# Patient Record
Sex: FEMALE | Race: WHITE | NOT HISPANIC OR LATINO
[De-identification: names, ages, dates, MRNs, and addresses within clinical notes are randomized per-mention and may not be internally consistent; named-entity substitution may affect disease eponyms.]

---

## 2019-08-16 PROBLEM — Z00.00 ENCOUNTER FOR PREVENTIVE HEALTH EXAMINATION: Status: ACTIVE | Noted: 2019-08-16

## 2019-09-11 ENCOUNTER — APPOINTMENT (OUTPATIENT)
Dept: PULMONOLOGY | Facility: CLINIC | Age: 74
End: 2019-09-11

## 2019-09-16 ENCOUNTER — APPOINTMENT (OUTPATIENT)
Dept: PULMONOLOGY | Facility: CLINIC | Age: 74
End: 2019-09-16
Payer: MEDICARE

## 2019-09-16 VITALS
HEART RATE: 81 BPM | RESPIRATION RATE: 16 BRPM | OXYGEN SATURATION: 94 % | HEIGHT: 64 IN | BODY MASS INDEX: 20.55 KG/M2 | SYSTOLIC BLOOD PRESSURE: 176 MMHG | DIASTOLIC BLOOD PRESSURE: 82 MMHG | TEMPERATURE: 97.6 F | WEIGHT: 120.38 LBS

## 2019-09-16 DIAGNOSIS — Z86.79 PERSONAL HISTORY OF OTHER DISEASES OF THE CIRCULATORY SYSTEM: ICD-10-CM

## 2019-09-16 PROCEDURE — 99203 OFFICE O/P NEW LOW 30 MIN: CPT

## 2019-09-16 NOTE — REVIEW OF SYSTEMS
[Fatigue] : fatigue [Hypertension] : ~T hypertension [As Noted in HPI] : as noted in HPI [Back Pain] : ~T back pain [Negative] : Allergy/Immunology

## 2019-09-16 NOTE — PHYSICAL EXAM
[Normal Conjunctiva] : the conjunctiva exhibited no abnormalities [General Appearance - In No Acute Distress] : no acute distress [Normal Oropharynx] : normal oropharynx [Heart Rate And Rhythm] : heart rate and rhythm were normal [Neck Appearance] : the appearance of the neck was normal [Heart Sounds] : normal S1 and S2 [] : no respiratory distress [Abnormal Walk] : normal gait [Nail Clubbing] : no clubbing of the fingernails [Cyanosis, Localized] : no localized cyanosis [No Focal Deficits] : no focal deficits [FreeTextEntry1] : crackles left anterior chest, right anterior chest and right lower lobe

## 2019-09-16 NOTE — REASON FOR VISIT
[Initial Evaluation] : an initial evaluation [Abnormal CT Scan] : abnormal CT Scan [Spouse] : spouse

## 2019-09-16 NOTE — HISTORY OF PRESENT ILLNESS
[FreeTextEntry1] : 73-year-old female with abnormal CAT scan of her chest. The patient reports that she has been in good health her entire life. She was born in [place] then emigrated to this country  40 years ago. 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 She reports a mild and intermittent dry cough but she denies any dyspnea, chest pain, recurrent fevers or a productive cough. She has hypertension for which she takes metoprolol. The cough is not particularly triggered by any events but it seems to be relieved by albuterol via nebulizer. The patient was prescribed this many years ago by a Dr. Zeferino Kaur.

## 2019-09-16 NOTE — ASSESSMENT
[FreeTextEntry1] : . The patient's  remembers her having been told that she had multiple scars on her lungs when she was younger. Based on the CAT scan, I suspect that she must of had a severe pneumonia which left her with multiple areas of bronchiectasis. Very surprisingly she has very little in the way of symptoms. Apparently she had a CT chest in 2016 which I do not have. Currently she denies any significant dyspnea or symptoms of recurrent infection. The cough seems minor and it is relieved occasionally by albuterol. I have not changed her medication. I've asked her to get a followup CAT scan in 3 months\par Her current CT shows extensive bornchiectasis with several areas changed since 2016. Most of the abnormalities appear to be inflammatory. She should follow up with me in 3 months for pulmonary functions and a plan to have follow up CTs and treatment.\par \par She currently is in between primary physicians and so I will wait till she is assigned a new primary physician before for sending this material to that physician.

## 2021-04-12 ENCOUNTER — APPOINTMENT (OUTPATIENT)
Dept: NEUROLOGY | Facility: CLINIC | Age: 76
End: 2021-04-12
Payer: MEDICARE

## 2021-04-12 ENCOUNTER — LABORATORY RESULT (OUTPATIENT)
Age: 76
End: 2021-04-12

## 2021-04-12 VITALS
WEIGHT: 110 LBS | HEIGHT: 64 IN | HEART RATE: 75 BPM | OXYGEN SATURATION: 95 % | BODY MASS INDEX: 18.78 KG/M2 | TEMPERATURE: 97.2 F | DIASTOLIC BLOOD PRESSURE: 82 MMHG | SYSTOLIC BLOOD PRESSURE: 150 MMHG

## 2021-04-12 DIAGNOSIS — Z78.9 OTHER SPECIFIED HEALTH STATUS: ICD-10-CM

## 2021-04-12 DIAGNOSIS — Z80.1 FAMILY HISTORY OF MALIGNANT NEOPLASM OF TRACHEA, BRONCHUS AND LUNG: ICD-10-CM

## 2021-04-12 DIAGNOSIS — Z87.898 PERSONAL HISTORY OF OTHER SPECIFIED CONDITIONS: ICD-10-CM

## 2021-04-12 DIAGNOSIS — Z80.41 FAMILY HISTORY OF MALIGNANT NEOPLASM OF OVARY: ICD-10-CM

## 2021-04-12 DIAGNOSIS — Z72.89 OTHER PROBLEMS RELATED TO LIFESTYLE: ICD-10-CM

## 2021-04-12 PROCEDURE — 99072 ADDL SUPL MATRL&STAF TM PHE: CPT

## 2021-04-12 PROCEDURE — 99205 OFFICE O/P NEW HI 60 MIN: CPT

## 2021-04-12 RX ORDER — PRAVASTATIN SODIUM 20 MG/1
20 TABLET ORAL
Refills: 0 | Status: ACTIVE | COMMUNITY

## 2021-04-12 RX ORDER — DONEPEZIL HYDROCHLORIDE 5 MG/1
5 TABLET ORAL
Refills: 0 | Status: ACTIVE | COMMUNITY

## 2021-04-12 RX ORDER — METOPROLOL SUCCINATE 50 MG/1
50 TABLET, EXTENDED RELEASE ORAL
Refills: 0 | Status: ACTIVE | COMMUNITY

## 2021-04-12 RX ORDER — METOPROLOL SUCCINATE 200 MG/1
TABLET, EXTENDED RELEASE ORAL
Refills: 0 | Status: DISCONTINUED | COMMUNITY
End: 2021-04-12

## 2021-04-12 RX ORDER — SIMVASTATIN 80 MG/1
TABLET, FILM COATED ORAL
Refills: 0 | Status: DISCONTINUED | COMMUNITY
End: 2021-04-12

## 2021-04-12 NOTE — PHYSICAL EXAM
[General Appearance - Alert] : alert [General Appearance - In No Acute Distress] : in no acute distress [Person] : oriented to person [Registration Intact] : recent registration memory intact [Naming Objects] : no difficulty naming common objects [Fluency] : fluency intact [Comprehension] : comprehension intact [Vocabulary] : adequate range of vocabulary [Total Score ___ / 30] : the patient achieved a score of [unfilled] /30 [Date / Time ___ / 5] : date / time [unfilled] / 5 [Place ___ / 5] : place [unfilled] / 5 [Registration ___ / 3] : registration [unfilled] / 3 [Serial Sevens ___/5] : serial sevens [unfilled] / 5 [Naming 2 Objects ___ / 2] : naming two objects [unfilled] / 2 [Repeating a Sentence ___ / 1] : repeating a sentence [unfilled] / 1 [Writing a Sentence ___ / 1] : write sentence [unfilled] / 1 [3-stage Verbal Command ___ / 3] : three-stage verbal command [unfilled] / 3 [Written Command ___ / 1] : written command [unfilled] / 1 [Copy a Design ___ / 1] : copy a design [unfilled] / 1 [Recall ___ / 3] : recall [unfilled] / 3 [Cranial Nerves Optic (II)] : visual acuity intact bilaterally,  visual fields full to confrontation, pupils equal round and reactive to light [Cranial Nerves Oculomotor (III)] : extraocular motion intact [Cranial Nerves Trigeminal (V)] : facial sensation intact symmetrically [Cranial Nerves Facial (VII)] : face symmetrical [Cranial Nerves Vestibulocochlear (VIII)] : hearing was intact bilaterally [Cranial Nerves Glossopharyngeal (IX)] : tongue and palate midline [Cranial Nerves Accessory (XI - Cranial And Spinal)] : head turning and shoulder shrug symmetric [Cranial Nerves Hypoglossal (XII)] : there was no tongue deviation with protrusion [Motor Strength] : muscle strength was normal in all four extremities [Motor Tone] : muscle tone was normal in all four extremities [Involuntary Movements] : no involuntary movements were seen [Sensation Tactile Decrease] : light touch was intact [Abnormal Walk] : normal gait [Balance] : balance was intact [Place] : disoriented to place [Time] : disoriented to time [Short Term Intact] : short term memory impaired [Concentration Intact] : a decrease in concentrating ability was observed [Coordination - Dysmetria Impaired Finger-to-Nose Bilateral] : not present [Coordination - Dysmetria Impaired Heel-to-Shin Bilateral] : not present [FreeTextEntry4] : MMSE 23/30, completed HS

## 2021-04-12 NOTE — ASSESSMENT
[FreeTextEntry1] : Memory loss MMSE 23/30, completed HS, she has difficulty with memory and processing information as well as weight loss; these are concerning for possible dementia (FT>AD), stroke and metastatic dz.  Will get MRI skip with and w/o caitie to evaluate for cva, mets and nph.  Will get labs for reversible causes of dementia and paraneoplastic panel.\par \par Patient will go back to her PMD for cancer screening

## 2021-04-12 NOTE — CONSULT LETTER
[Dear  ___] : Dear  [unfilled], [Consult Letter:] : I had the pleasure of evaluating your patient, [unfilled]. [Please see my note below.] : Please see my note below. [Consult Closing:] : Thank you very much for allowing me to participate in the care of this patient.  If you have any questions, please do not hesitate to contact me. [Sincerely,] : Sincerely, [FreeTextEntry3] : Dee Dee Borjas MD, MPH\par

## 2021-04-12 NOTE — HISTORY OF PRESENT ILLNESS
[FreeTextEntry1] : The patient is here for evaluation of memeory problems present for 2 yrs.  The patient says that she has trouble with remembering events, later come back to her.  She does not have double with ADLs or a-ADLs, burning food, paying checks or getting lost.\par \par As per the daughter, the patient repeat same story within 1 hours.  She has difficulty with processing new information.  She no longer reading books or does puzzles.  She lacks confidence, which is new.\par \par She has no change of mood or sleep; has lost 30-40 lb over the past 2 yrs.  No chills.  She has cough, no blood in stools.  Last angeli 2020: normal, never had colonoscopy.\par \par No bowel or bladder incontinence or gait problems.  No focal stroke symptoms.\par \par Mother Dx with AD at age 70's

## 2021-05-04 ENCOUNTER — APPOINTMENT (OUTPATIENT)
Dept: NEUROLOGY | Facility: CLINIC | Age: 76
End: 2021-05-04
Payer: MEDICARE

## 2021-05-04 VITALS
HEART RATE: 78 BPM | WEIGHT: 110 LBS | SYSTOLIC BLOOD PRESSURE: 138 MMHG | DIASTOLIC BLOOD PRESSURE: 82 MMHG | TEMPERATURE: 97.7 F | OXYGEN SATURATION: 96 % | HEIGHT: 64 IN | BODY MASS INDEX: 18.78 KG/M2

## 2021-05-04 DIAGNOSIS — R41.3 OTHER AMNESIA: ICD-10-CM

## 2021-05-04 PROCEDURE — 99214 OFFICE O/P EST MOD 30 MIN: CPT

## 2021-05-04 PROCEDURE — 99072 ADDL SUPL MATRL&STAF TM PHE: CPT

## 2021-05-04 NOTE — ASSESSMENT
[FreeTextEntry1] : Memory loss MMSE 23/30, completed HS, she has difficulty with memory and processing information as well as weight loss; these are concerning for possible dementia (FT>AD), which MRI brain showing chronic foci of microhemorrhages in cerebellum, occipital and parietal lobes likely sequela of HTN or amyloid angiopathy.  Will get genetic testing for amyloid angiopathy.  No asa at this time.  Will cw Aricept\par \par Patient will go back to her PMD for cancer screening. \par \par

## 2021-05-04 NOTE — HISTORY OF PRESENT ILLNESS
[FreeTextEntry1] : The patient is here for evaluation of memeory problems present for 2 yrs. The patient says that she has trouble with remembering events, later come back to her. She does not have double with ADLs or a-ADLs, burning food, paying checks or getting lost.\par \par As per the daughter, the patient repeat same story within 1 hours. She has difficulty with processing new information. She no longer reading books or does puzzles. She lacks confidence, which is new.\par \par She has no change of mood or sleep; has lost 30-40 lb over the past 2 yrs. No chills. She has cough, no blood in stools. Last angeli 2020: normal, never had colonoscopy.\par \par No bowel or bladder incontinence or gait problems. No focal stroke symptoms.\par \par Mother Dx with AD at age 70's \par The memory is more leveled since started on Aricept as per the gee.

## 2021-05-04 NOTE — DATA REVIEWED
[de-identified] : chronic foci of microhemorrhages in cerebellum, occipital and parietal lobes likely requaela of HTN or amyloid angiopathy [de-identified] : b12/ folate, mma, tft and rpr normal\par paraneopastic panel -vikash

## 2021-05-31 ENCOUNTER — EMERGENCY (EMERGENCY)
Facility: HOSPITAL | Age: 76
LOS: 1 days | Discharge: HOME | End: 2021-05-31
Attending: EMERGENCY MEDICINE
Payer: COMMERCIAL

## 2021-05-31 VITALS
OXYGEN SATURATION: 96 % | SYSTOLIC BLOOD PRESSURE: 176 MMHG | DIASTOLIC BLOOD PRESSURE: 79 MMHG | WEIGHT: 108.03 LBS | RESPIRATION RATE: 16 BRPM | TEMPERATURE: 98 F | HEART RATE: 77 BPM

## 2021-05-31 VITALS
OXYGEN SATURATION: 98 % | DIASTOLIC BLOOD PRESSURE: 88 MMHG | TEMPERATURE: 98 F | SYSTOLIC BLOOD PRESSURE: 174 MMHG | HEART RATE: 76 BPM | RESPIRATION RATE: 16 BRPM

## 2021-05-31 LAB
ALBUMIN SERPL ELPH-MCNC: 3.4 G/DL — LOW (ref 3.5–5)
ALP SERPL-CCNC: 129 U/L — HIGH (ref 40–120)
ALT FLD-CCNC: 24 U/L DA — SIGNIFICANT CHANGE UP (ref 10–60)
ANION GAP SERPL CALC-SCNC: 5 MMOL/L — SIGNIFICANT CHANGE UP (ref 5–17)
APPEARANCE UR: CLEAR — SIGNIFICANT CHANGE UP
AST SERPL-CCNC: 20 U/L — SIGNIFICANT CHANGE UP (ref 10–40)
BACTERIA # UR AUTO: ABNORMAL /HPF
BASOPHILS # BLD AUTO: 0.05 K/UL — SIGNIFICANT CHANGE UP (ref 0–0.2)
BASOPHILS NFR BLD AUTO: 0.4 % — SIGNIFICANT CHANGE UP (ref 0–2)
BILIRUB SERPL-MCNC: 0.3 MG/DL — SIGNIFICANT CHANGE UP (ref 0.2–1.2)
BILIRUB UR-MCNC: NEGATIVE — SIGNIFICANT CHANGE UP
BUN SERPL-MCNC: 19 MG/DL — HIGH (ref 7–18)
CALCIUM SERPL-MCNC: 9.4 MG/DL — SIGNIFICANT CHANGE UP (ref 8.4–10.5)
CHLORIDE SERPL-SCNC: 102 MMOL/L — SIGNIFICANT CHANGE UP (ref 96–108)
CO2 SERPL-SCNC: 30 MMOL/L — SIGNIFICANT CHANGE UP (ref 22–31)
COLOR SPEC: YELLOW — SIGNIFICANT CHANGE UP
CREAT SERPL-MCNC: 0.73 MG/DL — SIGNIFICANT CHANGE UP (ref 0.5–1.3)
D DIMER BLD IA.RAPID-MCNC: 228 NG/ML DDU — SIGNIFICANT CHANGE UP
DIFF PNL FLD: ABNORMAL
EOSINOPHIL # BLD AUTO: 0.05 K/UL — SIGNIFICANT CHANGE UP (ref 0–0.5)
EOSINOPHIL NFR BLD AUTO: 0.4 % — SIGNIFICANT CHANGE UP (ref 0–6)
EPI CELLS # UR: ABNORMAL /HPF
GLUCOSE SERPL-MCNC: 130 MG/DL — HIGH (ref 70–99)
GLUCOSE UR QL: NEGATIVE — SIGNIFICANT CHANGE UP
HCT VFR BLD CALC: 38.7 % — SIGNIFICANT CHANGE UP (ref 34.5–45)
HGB BLD-MCNC: 12.8 G/DL — SIGNIFICANT CHANGE UP (ref 11.5–15.5)
IMM GRANULOCYTES NFR BLD AUTO: 0.3 % — SIGNIFICANT CHANGE UP (ref 0–1.5)
KETONES UR-MCNC: NEGATIVE — SIGNIFICANT CHANGE UP
LEUKOCYTE ESTERASE UR-ACNC: NEGATIVE — SIGNIFICANT CHANGE UP
LYMPHOCYTES # BLD AUTO: 1.05 K/UL — SIGNIFICANT CHANGE UP (ref 1–3.3)
LYMPHOCYTES # BLD AUTO: 9 % — LOW (ref 13–44)
MCHC RBC-ENTMCNC: 29.8 PG — SIGNIFICANT CHANGE UP (ref 27–34)
MCHC RBC-ENTMCNC: 33.1 GM/DL — SIGNIFICANT CHANGE UP (ref 32–36)
MCV RBC AUTO: 90.2 FL — SIGNIFICANT CHANGE UP (ref 80–100)
MONOCYTES # BLD AUTO: 1.21 K/UL — HIGH (ref 0–0.9)
MONOCYTES NFR BLD AUTO: 10.4 % — SIGNIFICANT CHANGE UP (ref 2–14)
NEUTROPHILS # BLD AUTO: 9.25 K/UL — HIGH (ref 1.8–7.4)
NEUTROPHILS NFR BLD AUTO: 79.5 % — HIGH (ref 43–77)
NITRITE UR-MCNC: NEGATIVE — SIGNIFICANT CHANGE UP
NRBC # BLD: 0 /100 WBCS — SIGNIFICANT CHANGE UP (ref 0–0)
NT-PROBNP SERPL-SCNC: 141 PG/ML — SIGNIFICANT CHANGE UP (ref 0–450)
PH UR: 5 — SIGNIFICANT CHANGE UP (ref 5–8)
PLATELET # BLD AUTO: 275 K/UL — SIGNIFICANT CHANGE UP (ref 150–400)
POTASSIUM SERPL-MCNC: 3.8 MMOL/L — SIGNIFICANT CHANGE UP (ref 3.5–5.3)
POTASSIUM SERPL-SCNC: 3.8 MMOL/L — SIGNIFICANT CHANGE UP (ref 3.5–5.3)
PROT SERPL-MCNC: 9.1 G/DL — HIGH (ref 6–8.3)
PROT UR-MCNC: 15
RBC # BLD: 4.29 M/UL — SIGNIFICANT CHANGE UP (ref 3.8–5.2)
RBC # FLD: 12.9 % — SIGNIFICANT CHANGE UP (ref 10.3–14.5)
RBC CASTS # UR COMP ASSIST: ABNORMAL /HPF (ref 0–2)
SARS-COV-2 RNA SPEC QL NAA+PROBE: SIGNIFICANT CHANGE UP
SODIUM SERPL-SCNC: 137 MMOL/L — SIGNIFICANT CHANGE UP (ref 135–145)
SP GR SPEC: 1.02 — SIGNIFICANT CHANGE UP (ref 1.01–1.02)
TROPONIN I SERPL-MCNC: <0.015 NG/ML — SIGNIFICANT CHANGE UP (ref 0–0.04)
UROBILINOGEN FLD QL: NEGATIVE — SIGNIFICANT CHANGE UP
WBC # BLD: 11.65 K/UL — HIGH (ref 3.8–10.5)
WBC # FLD AUTO: 11.65 K/UL — HIGH (ref 3.8–10.5)
WBC UR QL: SIGNIFICANT CHANGE UP /HPF (ref 0–5)

## 2021-05-31 PROCEDURE — 71045 X-RAY EXAM CHEST 1 VIEW: CPT | Mod: 26

## 2021-05-31 PROCEDURE — 85379 FIBRIN DEGRADATION QUANT: CPT

## 2021-05-31 PROCEDURE — 85025 COMPLETE CBC W/AUTO DIFF WBC: CPT

## 2021-05-31 PROCEDURE — 71045 X-RAY EXAM CHEST 1 VIEW: CPT

## 2021-05-31 PROCEDURE — 93010 ELECTROCARDIOGRAM REPORT: CPT

## 2021-05-31 PROCEDURE — 81001 URINALYSIS AUTO W/SCOPE: CPT

## 2021-05-31 PROCEDURE — 82962 GLUCOSE BLOOD TEST: CPT

## 2021-05-31 PROCEDURE — 80053 COMPREHEN METABOLIC PANEL: CPT

## 2021-05-31 PROCEDURE — 93005 ELECTROCARDIOGRAM TRACING: CPT

## 2021-05-31 PROCEDURE — 99285 EMERGENCY DEPT VISIT HI MDM: CPT

## 2021-05-31 PROCEDURE — 87635 SARS-COV-2 COVID-19 AMP PRB: CPT

## 2021-05-31 PROCEDURE — 83880 ASSAY OF NATRIURETIC PEPTIDE: CPT

## 2021-05-31 PROCEDURE — 84484 ASSAY OF TROPONIN QUANT: CPT

## 2021-05-31 PROCEDURE — 70450 CT HEAD/BRAIN W/O DYE: CPT

## 2021-05-31 PROCEDURE — 99284 EMERGENCY DEPT VISIT MOD MDM: CPT | Mod: 25

## 2021-05-31 PROCEDURE — 36415 COLL VENOUS BLD VENIPUNCTURE: CPT

## 2021-05-31 PROCEDURE — 70450 CT HEAD/BRAIN W/O DYE: CPT | Mod: 26,MA

## 2021-05-31 RX ORDER — AZTREONAM 2 G
1 VIAL (EA) INJECTION
Qty: 14 | Refills: 0
Start: 2021-05-31

## 2021-05-31 NOTE — ED PROVIDER NOTE - CHPI ED SYMPTOMS NEG
no focal weakness or numbness, chills, night sweats, dysuria/no fever/no loss of consciousness/no vomiting

## 2021-05-31 NOTE — ED PROVIDER NOTE - PHYSICAL EXAMINATION
General: pt lying in stretcher, appears stated age and is not in distress  HEENT: AT/NC, pink conjunctiva, anicteric sclerae, EOMI, PERRLA, TMs smooth, grey, intact, with normal landmarks, nasal mucosa pink, no discharge, turbinates not enlarged; moist mucus membranes, tongue well-papillated, good dentition; posterior pharynx shows no erythema or exudates;   Neck: supple, full ROM, trachea midline, no JVD, no cervical LAD, no midline ttp or stepoffs  Lungs: symmetric excursion, b/l clear vesicular breath sounds with no wheezes, crackles, or rhonchi  Heart: rrr, S1, S2 normal; no S3 or S4; no murmurs or rubs  Abd: normal bowel sounds; soft, nontender; negative McBurney's point tenderness, negative Rothman's sign, no rebound, no guarding, spleen non-palpable; no hepatomegaly, no masses  Back: no midline spinal tenderness or stepoffs, no costovertebral angle tenderness  Extremities: no clubbing, cyanosis, or edema; no palpable deformities or fractures  Skin: good turgor; no rashes, petechiae, ecchymoses, or jaundice  Pulses: radial, posterior tibialis (PT), dorsalis pedis (DP) all 2+ & symmetric  Neuro: awake, alert, responsive; oriented to person, place and time; cranial nerves intact, EOMI, intact jaw movement, intact facial sensation, no facial asymmetry, hearing intact; no nystagmus, tongue midline; Motor: Normal tone in upper and lower extremities bilaterally strength 5/5; Sensory: intact to pinprick and light touch; Cerebellar: finger-to-nose intact; normal steady gait; negative Romberg’s sign; DTR: biceps, triceps, patellar, 2+, no pronator drift General: pt lying in stretcher, appears stated age and is not in distress, speaking in full clear sentences  HEENT: AT/NC, pink conjunctiva, anicteric sclerae, EOMI, PERRLA, mmm  Neck: supple, full ROM, trachea midline, no JVD, no cervical LAD, no midline ttp or stepoffs  Lungs: symmetric excursion, b/l clear vesicular breath sounds with no wheezes, crackles, or rhonchi, no respiratory distress  Heart: rrr, S1, S2 normal; no S3 or S4; no murmurs or rubs  Abd: normal bowel sounds; soft, nontender; negative McBurney's point tenderness, negative Rothman's sign, no rebound, no guarding, spleen non-palpable; no hepatomegaly, no masses  Back: no midline spinal tenderness or stepoffs, no costovertebral angle tenderness  Extremities: no clubbing, cyanosis, or edema; no palpable deformities or fractures  Skin: good turgor; no rashes, petechiae, ecchymoses, or jaundice  Pulses: radial, posterior tibialis (PT), dorsalis pedis (DP) all 2+ & symmetric  Neuro: awake, alert, responsive; oriented to person, place and time; cranial nerves intact, EOMI, intact jaw movement, intact facial sensation, no facial asymmetry, hearing intact; ; Motor: Normal tone in upper and lower extremities bilaterally; Sensory: intact normal steady gait

## 2021-05-31 NOTE — ED PROVIDER NOTE - CLINICAL SUMMARY MEDICAL DECISION MAKING FREE TEXT BOX
Pt w/ aforementioned presentation w/ unremarkable labs. Cardiac enzymes and d-dimer neg. no CP. no arrhythmia on EKG. UA w/ bacteria and trace blood but no cva tenderness to suggest pyelo or nephrolithiasis. Considering urinary frequency, will treat for UTI. CXR w/ increased interstitial markings more prominent in the upper lobe. Pt reports she had COVID several months ago and has no respiratory symptoms now. 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 Posterior periventricular predominant chronic microvascular ischemic changes. Consider MRI as clinically indicated. I explained the findings to patient. She insists on follow up outpatient with her PMD and will not be admitted. Has no neuro deficits. dc home

## 2021-05-31 NOTE — ED PROVIDER NOTE - PATIENT PORTAL LINK FT
You can access the FollowMyHealth Patient Portal offered by [facility] by registering at the following website: [email or web address]. By joining [facility]’s FollowMyHealth portal, you will also be able to view your health information using other applications (apps) compatible with our system.

## 2021-05-31 NOTE — ED ADULT NURSE NOTE - NSIMPLEMENTINTERV_GEN_ALL_ED
Implemented All Fall Risk Interventions:  [place] to call system. Call bell, personal items and telephone within reach. Instruct patient to call for assistance. Room bathroom lighting operational. Non-slip footwear when patient is off stretcher. Physically safe environment: no spills, clutter or unnecessary equipment. Stretcher in lowest position, wheels locked, appropriate side rails in place. Provide visual cue, wrist band, yellow gown, etc. Monitor gait and stability. Monitor for mental status changes and reorient to person, place, and time. Review medications for side effects contributing to fall risk. Reinforce activity limits and safety measures with patient and family.

## 2021-05-31 NOTE — ED PROVIDER NOTE - OBJECTIVE STATEMENT
75 year old female with PMHx of hypertension and hypercholesterolemia and no significant PSHx presents to the ED with complaints of two episodes of dizziness. Patient states that the first episode occurred yesterday while she was on her way home from work, and reports that she suddenly developed dizziness while on the street. Patient states that she had to sit down on the ground due to the dizziness. Patient reports that she did not lose consciousness, however states that she "fell" to the ground. Patient denies any head trauma during the incident. Patient states that the episode lasted for a few minutes before spontaneously resolving. Patient endorses that the second episode of dizziness occurred this morning when she woke up and suddenly developed the dizziness while she was still lying in bed. Patient denies any loss of consciousness, focal weakness or numbness, vomiting, fever, chills, or night sweats. Patient additionally complains of some urinary frequency. Patient denies any dysuria. NKDA. 75 year old female with PMHx of hypertension and hypercholesterolemia and no significant PSHx presents to the ED with complaints of two episodes of dizziness. Patient states that the first episode occurred yesterday while she was on her way home from work, and reports that she suddenly developed dizziness while on the street. Patient states that she had to sit down on the ground due to the dizziness. Patient reports that she did not fall or lose consciousness. Patient denies any head trauma during the incident. Patient states that the episode lasted for a few minutes before spontaneously resolving. Patient endorses that the second episode of dizziness occurred this morning when she woke up and suddenly developed the dizziness while she was still lying in bed. Patient denies any loss of consciousness, focal weakness or numbness, vomiting, fever, chills, or night sweats. Patient additionally complains of some urinary frequency. Patient denies any dysuria. NKDA. She denies any dyspnea, cough. Of note she was completely vaccinated for COVID19 a few weeks ago

## 2021-05-31 NOTE — ED ADULT NURSE NOTE - OBJECTIVE STATEMENT
Syncopal episode while returning home earlier today ,went to see PCP and then again had a syncopal episode ,hence came to ER ,similar episode 2 weeks ago for which denied seeking help.

## 2021-06-28 LAB
MISCELLANEOUS TEST: NORMAL
PROC NAME: NORMAL

## 2021-07-26 RX ORDER — NITROFURANTOIN MACROCRYSTAL 50 MG
1 CAPSULE ORAL
Qty: 0 | Refills: 0 | DISCHARGE
Start: 2021-07-26

## 2021-07-27 RX ORDER — CIPROFLOXACIN LACTATE 400MG/40ML
1 VIAL (ML) INTRAVENOUS
Qty: 0 | Refills: 0 | DISCHARGE
Start: 2021-07-27

## 2021-07-28 ENCOUNTER — RESULT REVIEW (OUTPATIENT)
Age: 76
End: 2021-07-28

## 2021-08-02 ENCOUNTER — INPATIENT (INPATIENT)
Facility: HOSPITAL | Age: 76
LOS: 1 days | Discharge: HOME | DRG: 312 | End: 2021-08-04
Attending: HOSPITALIST | Admitting: HOSPITALIST
Payer: COMMERCIAL

## 2021-08-02 VITALS
WEIGHT: 109.13 LBS | TEMPERATURE: 98 F | DIASTOLIC BLOOD PRESSURE: 73 MMHG | HEIGHT: 68 IN | HEART RATE: 65 BPM | SYSTOLIC BLOOD PRESSURE: 155 MMHG | OXYGEN SATURATION: 98 % | RESPIRATION RATE: 16 BRPM

## 2021-08-02 DIAGNOSIS — I10 ESSENTIAL (PRIMARY) HYPERTENSION: ICD-10-CM

## 2021-08-02 DIAGNOSIS — E78.00 PURE HYPERCHOLESTEROLEMIA, UNSPECIFIED: ICD-10-CM

## 2021-08-02 DIAGNOSIS — D72.829 ELEVATED WHITE BLOOD CELL COUNT, UNSPECIFIED: ICD-10-CM

## 2021-08-02 DIAGNOSIS — R55 SYNCOPE AND COLLAPSE: ICD-10-CM

## 2021-08-02 DIAGNOSIS — Z29.9 ENCOUNTER FOR PROPHYLACTIC MEASURES, UNSPECIFIED: ICD-10-CM

## 2021-08-02 LAB
ALBUMIN SERPL ELPH-MCNC: 3.1 G/DL — LOW (ref 3.5–5)
ALP SERPL-CCNC: 132 U/L — HIGH (ref 40–120)
ALT FLD-CCNC: 51 U/L DA — SIGNIFICANT CHANGE UP (ref 10–60)
ANION GAP SERPL CALC-SCNC: 4 MMOL/L — LOW (ref 5–17)
AST SERPL-CCNC: 23 U/L — SIGNIFICANT CHANGE UP (ref 10–40)
BASOPHILS # BLD AUTO: 0.07 K/UL — SIGNIFICANT CHANGE UP (ref 0–0.2)
BASOPHILS NFR BLD AUTO: 0.5 % — SIGNIFICANT CHANGE UP (ref 0–2)
BILIRUB SERPL-MCNC: 0.3 MG/DL — SIGNIFICANT CHANGE UP (ref 0.2–1.2)
BUN SERPL-MCNC: 14 MG/DL — SIGNIFICANT CHANGE UP (ref 7–18)
CALCIUM SERPL-MCNC: 9.1 MG/DL — SIGNIFICANT CHANGE UP (ref 8.4–10.5)
CHLORIDE SERPL-SCNC: 103 MMOL/L — SIGNIFICANT CHANGE UP (ref 96–108)
CO2 SERPL-SCNC: 28 MMOL/L — SIGNIFICANT CHANGE UP (ref 22–31)
CREAT SERPL-MCNC: 0.64 MG/DL — SIGNIFICANT CHANGE UP (ref 0.5–1.3)
EOSINOPHIL # BLD AUTO: 0.06 K/UL — SIGNIFICANT CHANGE UP (ref 0–0.5)
EOSINOPHIL NFR BLD AUTO: 0.5 % — SIGNIFICANT CHANGE UP (ref 0–6)
GLUCOSE SERPL-MCNC: 131 MG/DL — HIGH (ref 70–99)
HCT VFR BLD CALC: 37 % — SIGNIFICANT CHANGE UP (ref 34.5–45)
HGB BLD-MCNC: 12 G/DL — SIGNIFICANT CHANGE UP (ref 11.5–15.5)
IMM GRANULOCYTES NFR BLD AUTO: 0.6 % — SIGNIFICANT CHANGE UP (ref 0–1.5)
LYMPHOCYTES # BLD AUTO: 0.98 K/UL — LOW (ref 1–3.3)
LYMPHOCYTES # BLD AUTO: 7.7 % — LOW (ref 13–44)
MCHC RBC-ENTMCNC: 29.4 PG — SIGNIFICANT CHANGE UP (ref 27–34)
MCHC RBC-ENTMCNC: 32.4 GM/DL — SIGNIFICANT CHANGE UP (ref 32–36)
MCV RBC AUTO: 90.7 FL — SIGNIFICANT CHANGE UP (ref 80–100)
MONOCYTES # BLD AUTO: 1.04 K/UL — HIGH (ref 0–0.9)
MONOCYTES NFR BLD AUTO: 8.2 % — SIGNIFICANT CHANGE UP (ref 2–14)
NEUTROPHILS # BLD AUTO: 10.5 K/UL — HIGH (ref 1.8–7.4)
NEUTROPHILS NFR BLD AUTO: 82.5 % — HIGH (ref 43–77)
NRBC # BLD: 0 /100 WBCS — SIGNIFICANT CHANGE UP (ref 0–0)
PLATELET # BLD AUTO: 296 K/UL — SIGNIFICANT CHANGE UP (ref 150–400)
POTASSIUM SERPL-MCNC: 3.9 MMOL/L — SIGNIFICANT CHANGE UP (ref 3.5–5.3)
POTASSIUM SERPL-SCNC: 3.9 MMOL/L — SIGNIFICANT CHANGE UP (ref 3.5–5.3)
PROT SERPL-MCNC: 8.7 G/DL — HIGH (ref 6–8.3)
RBC # BLD: 4.08 M/UL — SIGNIFICANT CHANGE UP (ref 3.8–5.2)
RBC # FLD: 12.7 % — SIGNIFICANT CHANGE UP (ref 10.3–14.5)
SARS-COV-2 RNA SPEC QL NAA+PROBE: SIGNIFICANT CHANGE UP
SODIUM SERPL-SCNC: 135 MMOL/L — SIGNIFICANT CHANGE UP (ref 135–145)
TROPONIN I SERPL-MCNC: <0.015 NG/ML — SIGNIFICANT CHANGE UP (ref 0–0.04)
WBC # BLD: 12.73 K/UL — HIGH (ref 3.8–10.5)
WBC # FLD AUTO: 12.73 K/UL — HIGH (ref 3.8–10.5)

## 2021-08-02 PROCEDURE — 71045 X-RAY EXAM CHEST 1 VIEW: CPT | Mod: 26

## 2021-08-02 PROCEDURE — 70450 CT HEAD/BRAIN W/O DYE: CPT | Mod: 26,MG

## 2021-08-02 PROCEDURE — 74177 CT ABD & PELVIS W/CONTRAST: CPT | Mod: 26

## 2021-08-02 PROCEDURE — 99285 EMERGENCY DEPT VISIT HI MDM: CPT

## 2021-08-02 PROCEDURE — G1004: CPT

## 2021-08-02 PROCEDURE — 71260 CT THORAX DX C+: CPT | Mod: 26

## 2021-08-02 PROCEDURE — 99223 1ST HOSP IP/OBS HIGH 75: CPT | Mod: GC

## 2021-08-02 RX ORDER — METOPROLOL TARTRATE 50 MG
50 TABLET ORAL DAILY
Refills: 0 | Status: DISCONTINUED | OUTPATIENT
Start: 2021-08-02 | End: 2021-08-04

## 2021-08-02 RX ORDER — DONEPEZIL HYDROCHLORIDE 10 MG/1
5 TABLET, FILM COATED ORAL AT BEDTIME
Refills: 0 | Status: DISCONTINUED | OUTPATIENT
Start: 2021-08-02 | End: 2021-08-04

## 2021-08-02 RX ORDER — ENOXAPARIN SODIUM 100 MG/ML
40 INJECTION SUBCUTANEOUS DAILY
Refills: 0 | Status: DISCONTINUED | OUTPATIENT
Start: 2021-08-02 | End: 2021-08-04

## 2021-08-02 NOTE — ED PROVIDER NOTE - OBJECTIVE STATEMENT
Patient reports she passed out at the gym today while exercising. Had been exercising for about 90 minutes. No sx before she passed out. No fever, ha, cp, sob, ap, n/v/d, focal weakness, paresthesias. Drove herself home.  found her passed out on the couch again. Patient has no memory of 2nd syncopal episode. Feels at baseline.

## 2021-08-02 NOTE — H&P ADULT - PROBLEM SELECTOR PLAN 1
- Patient presented with 2 episodes of syncope   - Neurocardiogenic vs Orthostatic hypotension vs cardiovascular  - EKG: NSR, Trops 1st set -ve   - CT head chronic ischemic changes neg for acute intracranial process   - Admitted to Tele to monitor for any arrhythmias  - F/u Orthostatics and if positive consider hydration, adjustment of BP meds and starting Midodrine if no improvement   - F/u Echo and Troponin  - Fall precautions  - Cardio Dr smith - Patient presented with 2 episodes of syncope   - Neurocardiogenic vs Orthostatic hypotension vs cardiovascular  - EKG: NSR, Trops 1st set -ve   - CT head chronic ischemic changes neg for acute intracranial process   - Admitted to Tele to monitor for any arrhythmias  - F/u Orthostatics and if positive consider hydration, adjustment of BP meds and starting Midodrine if no improvement   - F/u Echo and Troponin  - Fall precautions  - Cardio Dr Mahan consulted

## 2021-08-02 NOTE — H&P ADULT - PROBLEM SELECTOR PLAN 6
IMPROVE VTE Individual Risk Assessment  RISK                                                                Points  [  ] Previous VTE                                                  3  [  ] Thrombophilia                                               2  [  ] Lower limb paralysis                                      2        (unable to hold up >15 seconds)    [  ] Current Cancer                                              2         (within 6 months)  [  ] Immobilization > 24 hrs                                1  [  ] ICU/CCU stay > 24 hours                              1  [  ] Age > 60                                                      1  IMPROVE VTE Score ____2_____    PPI for GI prophylaxis  Lovenox for DVT PPX

## 2021-08-02 NOTE — H&P ADULT - ASSESSMENT
76 y/o female from home, walks independently, has past medical history of dementia, hypertension and HLD came to ED c/o 2 episodes of syncope, admitted for syncope w/u

## 2021-08-02 NOTE — ED PROVIDER NOTE - NS ED MD DISPO ISOLATION TYPES
Cialis Pending    Insurance response  Prescription Drug Insurance: [employer]  Notes: Prior authorization submitted - will update provider when decision has been made by insurance.     Cover My Meds Key #:  [id number]      
I just saw pt today and he had picked up the med, so apparently willing to pay out of pocket, I don't have any other diagnosis for the Cialis so for now will leave it as prescribed.  Gina Juárez MD    
Pharmacy faxed in a request for prior authorization on:    Medication: tadalafil (CIALIS) 5 MG tablet      Dosage: Take 1 tablet at least 30 minutes prior to anticipated sexual activity as 1 single dose ;not to exceed once daily  Quantity requested:  90  Pharmacy for prescription has been selected.    Initiation of prior authorization needed.    
Summer from [employer] Prior authorization department called advised she had further information regarding the prior auth  
Tadalafil Denied   Per insurance they will only cover tadalafil for diagnosis of BPH.  Denied due to diagnosis of ED.    Formulary reason  Prescription Drug Insurance: [employer]  Denial reason:       Appeal information:       Please reply to telephone encounter on how to proceed with medication authorization.      
We will close this telephone encounter and prior auth request.  
None

## 2021-08-02 NOTE — H&P ADULT - HISTORY OF PRESENT ILLNESS
74 y/o female from home, walks independently, has past medical history of dementia, hypertension and HLD came to ED c/o 2 episodes of syncope. First episode happened while at the gym this am, no premonitory symptoms, no head trauma. Second episode happened at home while sitting in the couch, witnessed by  who described as sudden LOC, no head trauma, no seizure like activity, no incontinence, no tongue bite. Patient denied any chest pain, palpitations, sob, dizziness or lightheadedness.     GOC: Full Code    76 y/o female from home, walks independently, has past medical history of dementia, hypertension and HLD came to ED c/o 2 episodes of syncope. First episode happened while at the gym this am, no premonitory symptoms, no head trauma. Second episode happened at home while sitting in the couch, witnessed by  who described as sudden LOC, no head trauma, no seizure like activity, no incontinence, no tongue bite. Patient denied any chest pain, palpitations, sob, dizziness or lightheadedness. Of note, patient refers unintentional weight loss approximately 30 lbs, pending colonoscopy and mammogram as outpatient.    GOC: Full Code

## 2021-08-02 NOTE — ED ADULT TRIAGE NOTE - CHIEF COMPLAINT QUOTE
" I was at the gym at 2pm and I was told I passed out. abrasion to right knee". "Passed out again at home on the couch one hour ago" per

## 2021-08-02 NOTE — H&P ADULT - PROBLEM SELECTOR PLAN 3
- Patient has hx of hyperlipidemia on Statin at home   - C/w Statin   - F/u lipid profile - Patient with leucocytosis, afebrile, no urinary, GI,  complaints  - Most likely reactive vs dehydration   - Monitor for now, no abx as there is no concern for infection

## 2021-08-02 NOTE — PHARMACOTHERAPY INTERVENTION NOTE - COMMENTS
Patient's home pharmacy ([facility] on [address] [facility] [phone]) was contacted and the Outside Medication Record was updated based on the pharmacy prescription history.    As per pharmacy Patient picked up:  Cipro 500mg one tablet by mouth two times a day for 7 days on 07/27/2021  Macrobid 100mg one tablet by mouth two times a day for 7 days on 07/26/2021

## 2021-08-02 NOTE — H&P ADULT - ATTENDING COMMENTS
Patient is a 75 year old female from home with PMH of HLD p/w c/o syncopal event. Patient reports that she passed out at the gym yesterday after exercising for about 1 hour. She denies hitting her head but fell on her knee. Her gym mates helped her and reports that she have lost consciousness for about 5-10 minutes. She denies any prodromal symptoms, no dizziness, chest pain, palpitations, shortness of breath, seizure like activity, tongue biting, or any post-syncopal confusion.  She also had another near syncopal event at her home which was witnessed by her . She denies any hx of CAD, stroke, no prior hx of syncope. She reports being a healthy individual and exercise .......   Of note, patient reports almost 30lb un intentional weight loss in loss in last 3 months. She has not noticed any change in her appetite and eats well. Denies any change in her work out regime. She is scheduled to get a mammogram this month and intends to schedule a colonoscopy soon. Her PCP Dr. Caldwell is aware of her weight loss.     In ED, patient's initial VS were stable and appears in no apparent distress.      Vital Signs Last 24 Hrs  T(C): 36.6 (02 Aug 2021 15:44), Max: 36.6 (02 Aug 2021 15:44)  T(F): 97.8 (02 Aug 2021 15:44), Max: 97.8 (02 Aug 2021 15:44)  HR: 65 (02 Aug 2021 15:44) (65 - 65)  BP: 155/73 (02 Aug 2021 15:44) (155/73 - 155/73)  RR: 16 (02 Aug 2021 15:44) (16 - 16)  SpO2: 98% (02 Aug 2021 15:44) (98% - 98%)    PE same as above     Labs & Radiology:                           12.0   12.73 )-----------( 296      ( 02 Aug 2021 16:57 )             37.0   08-02    135  |  103  |  14  ----------------------------<  131<H>  3.9   |  28  |  0.64    Ca    9.1      02 Aug 2021 16:57    TPro  8.7<H>  /  Alb  3.1<L>  /  TBili  0.3  /  DBili  x   /  AST  23  /  ALT  51  /  AlkPhos  132<H>  08-02        < from: CT Head No Cont (08.02.21 @ 18:12) >    IMPRESSION:    Re demonstration volume loss, microvascular disease, 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 If symptoms persist consider follow-up head CT or MR if no contraindications.    --- End of Report ---      < end of copied text >    Assessment and Plan:    1. Syncope:     2. Leukocytosis:    3. Un-intentional weight loss: Patient is a 75 year old female from home with PMH of HLD p/w c/o syncopal event. Patient reports that she passed out at the gym this morning  after exercising for about 1 hour. She denies hitting her head but fell on her knee. Her gym mates helped her and reports that she have lost consciousness for about 5-10 minutes. She denies any prodromal symptoms, no dizziness, chest pain, palpitations, shortness of breath, seizure like activity, tongue biting, or any post-syncopal confusion.  She also had another near syncopal event at her home which was witnessed by her . She denies any hx of CAD, stroke, no prior hx of syncope. She reports being a healthy individual and exercises 2-3 times per week.   Of note, patient reports almost 30lb un intentional weight loss in loss in last 3 months. She has not noticed any change in her appetite and eats well. Denies any change in her work out regime. She is scheduled to get a mammogram this month and intends to schedule a colonoscopy soon. Her PCP Dr. Caldwell is aware of her weight loss.     In ED, patient's initial VS were stable and appears in no apparent distress.      Vital Signs Last 24 Hrs  T(C): 36.6 (02 Aug 2021 15:44), Max: 36.6 (02 Aug 2021 15:44)  T(F): 97.8 (02 Aug 2021 15:44), Max: 97.8 (02 Aug 2021 15:44)  HR: 65 (02 Aug 2021 15:44) (65 - 65)  BP: 155/73 (02 Aug 2021 15:44) (155/73 - 155/73)  RR: 16 (02 Aug 2021 15:44) (16 - 16)  SpO2: 98% (02 Aug 2021 15:44) (98% - 98%)    PE same as above     Labs & Radiology:                           12.0   12.73 )-----------( 296      ( 02 Aug 2021 16:57 )             37.0   08-02    135  |  103  |  14  ----------------------------<  131<H>  3.9   |  28  |  0.64    Ca    9.1      02 Aug 2021 16:57    TPro  8.7<H>  /  Alb  3.1<L>  /  TBili  0.3  /  DBili  x   /  AST  23  /  ALT  51  /  AlkPhos  132<H>  08-02        < from: CT Head No Cont (08.02.21 @ 18:12) >    IMPRESSION:    Re demonstration volume loss, microvascular disease, 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 If symptoms persist consider follow-up head CT or MR if no contraindications.    --- End of Report ---      < end of copied text >    Assessment and Plan:    1. Syncope:   -Patient p/w syncopal event, could be vasovagal or cardiogenic from underlying arrythmia or structural defect   -EKG: NSR @ 68 bpm   -CE negative x 2 , CT head: Re demonstration volume loss, microvascular disease, 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  -Follow orthostatic vitals, ECHO, telemetry   -Cardio consult     2. Leukocytosis:  -Patient with mild leukocytosis, could be reactive   -No signs of infection     3. Un-intentional weight loss:  - Patient with unintentional weight loss, CT imaging with no suspicious  findings   -Patient to have out-patient f/u with her PCP Patient is a 75 year old female from home with PMH of HLD p/w c/o syncopal event. Patient reports that she passed out at the gym this morning  after exercising for about 1 hour. She denies hitting her head but fell on her knee. Her gym mates helped her and reports that she have lost consciousness for about 5-10 minutes. She denies any prodromal symptoms, no dizziness, chest pain, palpitations, shortness of breath, seizure like activity, tongue biting, or any post-syncopal confusion.  She also had another near syncopal event at her home which was witnessed by her . She denies any hx of CAD, stroke, no prior hx of syncope. She reports being a healthy individual and exercises 2-3 times per week.   Of note, patient reports almost 30lb un intentional weight loss in loss in last 3 months. She has not noticed any change in her appetite and eats well. Denies any change in her work out regime. She is scheduled to get a mammogram this month and intends to schedule a colonoscopy soon. Her PCP Dr. Caldwell is aware of her weight loss.     In ED, patient's initial VS were stable and appears in no apparent distress.      Vital Signs Last 24 Hrs  T(C): 36.6 (02 Aug 2021 15:44), Max: 36.6 (02 Aug 2021 15:44)  T(F): 97.8 (02 Aug 2021 15:44), Max: 97.8 (02 Aug 2021 15:44)  HR: 65 (02 Aug 2021 15:44) (65 - 65)  BP: 155/73 (02 Aug 2021 15:44) (155/73 - 155/73)  RR: 16 (02 Aug 2021 15:44) (16 - 16)  SpO2: 98% (02 Aug 2021 15:44) (98% - 98%)    PE same as above     Labs & Radiology:                           12.0   12.73 )-----------( 296      ( 02 Aug 2021 16:57 )             37.0   08-02    135  |  103  |  14  ----------------------------<  131<H>  3.9   |  28  |  0.64    Ca    9.1      02 Aug 2021 16:57    TPro  8.7<H>  /  Alb  3.1<L>  /  TBili  0.3  /  DBili  x   /  AST  23  /  ALT  51  /  AlkPhos  132<H>  08-02        < from: CT Head No Cont (08.02.21 @ 18:12) >    IMPRESSION:    Re demonstration volume loss, microvascular disease, 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 If symptoms persist consider follow-up head CT or MR if no contraindications.    --- End of Report ---      < end of copied text >    Assessment and Plan:    1. Syncope:   -Patient p/w syncopal event, could be vasovagal or cardiogenic from underlying arrythmia or structural defect   -EKG: NSR @ 68 bpm   -CE negative x 2 , CT head: Re demonstration volume loss, microvascular disease, 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  -Follow orthostatic vitals, ECHO, telemetry   -Cardio consult     2. Leukocytosis:  -Patient with mild leukocytosis, could be reactive   -No signs of infection     3. Un-intentional weight loss:  - Patient with unintentional weight loss, CT imaging with no suspicious  findings   -Patient to have out-patient f/u with her PCP    4. Hypertension: c/w home meds

## 2021-08-02 NOTE — H&P ADULT - NSHPPHYSICALEXAM_GEN_ALL_CORE
ICU Vital Signs Last 24 Hrs  T(C): 36.7 (02 Aug 2021 21:49), Max: 36.7 (02 Aug 2021 21:49)  T(F): 98 (02 Aug 2021 21:49), Max: 98 (02 Aug 2021 21:49)  HR: 60 (02 Aug 2021 21:49) (60 - 65)  BP: 163/72 (02 Aug 2021 21:49) (155/73 - 163/72)  RR: 17 (02 Aug 2021 21:49) (16 - 17)  SpO2: 97% (02 Aug 2021 21:49) (97% - 98%)      GENERAL: NAD, average weight  HEAD:  Atraumatic, Normocephalic  EYES:  conjunctiva and sclera clear  NECK: Supple, No JVD, Normal thyroid  CHEST/LUNG: Clear to auscultation. Clear to percussion bilaterally; No rales, rhonchi, wheezing, or rubs  HEART: Regular rate and rhythm; No murmurs, rubs, or gallops  ABDOMEN: Soft, Nontender, Nondistended; Bowel sounds present, no pain or masses on palpation   NERVOUS SYSTEM:  Alert & Oriented X3  EXTREMITIES:  2+ Peripheral Pulses, No clubbing, cyanosis, or edema  : voiding well   SKIN: warm, dry

## 2021-08-02 NOTE — H&P ADULT - PROBLEM SELECTOR PLAN 5
IMPROVE VTE Individual Risk Assessment  RISK                                                                Points  [  ] Previous VTE                                                  3  [  ] Thrombophilia                                               2  [  ] Lower limb paralysis                                      2        (unable to hold up >15 seconds)    [  ] Current Cancer                                              2         (within 6 months)  [  ] Immobilization > 24 hrs                                1  [  ] ICU/CCU stay > 24 hours                              1  [  ] Age > 60                                                      1  IMPROVE VTE Score ____2_____    PPI for GI prophylaxis  Lovenox for DVT PPX - Patient has history of Hypertension on metoprolol at home   - C/w home meds with parameters  - Monitor BP and adjust meds as needed

## 2021-08-02 NOTE — ED ADULT NURSE NOTE - NSFALLRSKUNASSIST_ED_ALL_ED
After Visit Summary   5/23/2018    Jerel Day    MRN: [id number]           Patient Information     Date Of Birth          1997        Visit Information        Provider Department      5/23/2018 2:30 PM [facility] PSYCHIATRY NURSE [facility] Psychiatry        Today's Diagnoses     Schizoaffective disorder, bipolar type (H)    -  1       Follow-ups after your visit        Your next 10 appointments already scheduled     May 23, 2018  3:00 PM CDT   Navigate Psychotherapy with EDELMIRA Reynolds   [facility]iatry ([facility])    [address]  [facility] [address]   [phone]            May 30, 2018  5:00 PM CDT   Navigate Family Therapy with Shai Londono [facility] Psychiatry ([facility])    [address]  [facility] [address] [phone]            May 30, 2018  5:00 PM CDT   Navigate Psychotherapy with Anahi Summers ARIANNE   [facility] Psychiatry ([facility])    [address] [place] [id number] [phone]              Who to contact     Please call your clinic at [phone] to:    Ask questions about your health    Make or cancel appointments    Discuss your medicines    Learn about your test results    Speak to your doctor            Additional Information About Your Visit        Care EveryWhere ID     This is your Care EveryWhere ID. This could be used by other organizations to access your [place] medical records  [id number]        Your Vitals Were     Pulse BMI (Body Mass Index)                74 23.22 kg/m2           Blood Pressure from Last 3 Encounters:   05/23/18 114/60   04/25/18 106/60   04/18/18 111/64    Weight from Last 3 Encounters:   05/23/18 70.3 kg (155 lb)   04/18/18 69.6 kg (153 lb 6.4 oz)   03/19/18 67.5 kg (148 lb 12.8 oz)              We Performed the Following     INJECTION INTRAMUSCULAR OR SUB-Q        Primary Care Provider Office Phone # Fax #    [facility] [phone]       [facility] [address] [address]        Equal Access to Services     MUNIRA MADDOX : Hadii aad ku hadgustavo Diez, dariel missaelrosemarie, kaela webb, keagan olguin lamarco akp aly. So [facility] [phone].    ATENCIÓN: Si habla español, tiene a grimm disposición servicios gratuitos de asistencia lingüística. Llame al [phone].    We comply with applicable federal civil rights laws and [place] laws. We do not discriminate on the basis of race, color, national origin, age, disability, sex, sexual orientation, or gender identity.            Thank you!     Thank you for choosing [facility] PSYCHIATRY  for your care. Our goal is always to provide you with excellent care. Hearing back from our patients is one way we can continue to improve our services. Please take a few minutes to complete the written survey that you may receive in the mail after your visit with us. Thank you!             Your Updated Medication List - Protect others around you: Learn how to safely use, store and throw away your medicines at www.disposemymeds.org.          This list is accurate as of 5/23/18  2:49 PM.  Always use your most recent med list.                   Brand Name Dispense Instructions for use Diagnosis    ALPRAZOLAM PO      Take 0.5 mg by mouth daily        ARIPiprazole  MG extended release inj syringe    ABILIFY MAINTENA    1 Syringe    Inject 1 Syringe (400 mg) into the muscle every 28 days    Schizoaffective disorder, bipolar type (H)       cholecalciferol 1000 UNIT tablet    vitamin D3     Take 1,000 Units by mouth        lithium 450 MG CR tablet    ESKALITH    60 tablet    Take 2 tablets (900 mg) by mouth At Bedtime    Schizoaffective disorder, bipolar type (H)          no

## 2021-08-02 NOTE — ED ADULT NURSE NOTE - CAS TRG GENERAL NORM CIRC DET
Strong peripheral pulses

## 2021-08-02 NOTE — H&P ADULT - PROBLEM SELECTOR PLAN 2
- Patient with leucocytosis, afebrile, no urinary, GI,  complaints  - Most likely reactive vs dehydration   - Monitor for now, no abx as there is no concern for infection - Patient with unintentional weight loss approximately 30 lbs  - R/o malignancy  - As per patient, pending colonoscopy and mammogram as outpatient   - F/u CT chest and abdomen/pelvis  - Consider consultants w/ results

## 2021-08-02 NOTE — ED PROVIDER NOTE - SKIN, MLM
Skin normal color for race, warm, dry. superficial abrasion to anterior right knee. neurovascular and tendon intact distal to injury

## 2021-08-02 NOTE — ED ADULT NURSE NOTE - NSIMPLEMENTINTERV_GEN_ALL_ED
Implemented All Fall Risk Interventions:  [place] to call system. Call bell, personal items and telephone within reach. Instruct patient to call for assistance. Room bathroom lighting operational. Non-slip footwear when patient is off stretcher. Physically safe environment: no spills, clutter or unnecessary equipment. Stretcher in lowest position, wheels locked, appropriate side rails in place. Provide visual cue, wrist band, yellow gown, etc. Monitor gait and stability. Monitor for mental status changes and reorient to person, place, and time. Review medications for side effects contributing to fall risk. Reinforce activity limits and safety measures with patient and family.

## 2021-08-02 NOTE — H&P ADULT - PROBLEM SELECTOR PLAN 4
- Patient has history of Hypertension on metoprolol at home   - C/w home meds with parameters  - Monitor BP and adjust meds as needed - Patient has hx of hyperlipidemia on Statin at home   - C/w Statin   - F/u lipid profile

## 2021-08-02 NOTE — ED PROVIDER NOTE - NEUROLOGICAL, MLM
Neuro: CNII-XII intact. Gait steady. Speech clear. Reflexes 2+/4 in all extremities. Strength 5/5 in all extremities. Normal coordination. Normal and equal sensation b/l.

## 2021-08-02 NOTE — ED PROVIDER NOTE - CLINICAL SUMMARY MEDICAL DECISION MAKING FREE TEXT BOX
Patient with 2 syncopal episodes today with no preceding sx. concern for arrhythmia. Will check labs, CTH, admit.

## 2021-08-02 NOTE — ED PROVIDER NOTE - PROGRESS NOTE DETAILS
Patient is resting comfortably, NAD. Patient is resting comfortably, NAD.  reports patient has had 30Lbs weight loss in the past 3 months. patient reports it is unintentional weight loss. no change in appetite, exercise, food intake. Patient is resting comfortably, NAD. Endorsed to Dr. Hurt, who asked me to admit under Dr. Wise. Will order CT chest/abd/pelvis due to weight loss. Inpatient team will follow result.

## 2021-08-02 NOTE — H&P ADULT - NSHPREVIEWOFSYSTEMS_GEN_ALL_CORE
REVIEW OF SYSTEMS:  CONSTITUTIONAL: No fever, weight loss+, No fatigue  RESPIRATORY: No cough, wheezing, chills or hemoptysis; No shortness of breath  CARDIOVASCULAR: No chest pain, palpitations, dizziness, or leg swelling  GASTROINTESTINAL: No abdominal pain. No nausea, vomiting, or hematemesis; No diarrhea or constipation. No melena or hematochezia.  GENITOURINARY: No dysuria or hematuria, urinary frequency  NEUROLOGICAL: No headaches, memory loss, loss of strength, numbness, or tremors  SKIN: No itching, burning, rashes, or lesions

## 2021-08-03 DIAGNOSIS — R91.1 SOLITARY PULMONARY NODULE: ICD-10-CM

## 2021-08-03 DIAGNOSIS — R63.4 ABNORMAL WEIGHT LOSS: ICD-10-CM

## 2021-08-03 DIAGNOSIS — Z02.9 ENCOUNTER FOR ADMINISTRATIVE EXAMINATIONS, UNSPECIFIED: ICD-10-CM

## 2021-08-03 LAB
A1C WITH ESTIMATED AVERAGE GLUCOSE RESULT: 6 % — HIGH (ref 4–5.6)
ANION GAP SERPL CALC-SCNC: 4 MMOL/L — LOW (ref 5–17)
BUN SERPL-MCNC: 14 MG/DL — SIGNIFICANT CHANGE UP (ref 7–18)
CALCIUM SERPL-MCNC: 9.1 MG/DL — SIGNIFICANT CHANGE UP (ref 8.4–10.5)
CHLORIDE SERPL-SCNC: 105 MMOL/L — SIGNIFICANT CHANGE UP (ref 96–108)
CHOLEST SERPL-MCNC: 160 MG/DL — SIGNIFICANT CHANGE UP
CO2 SERPL-SCNC: 33 MMOL/L — HIGH (ref 22–31)
COVID-19 SPIKE DOMAIN AB INTERP: POSITIVE
COVID-19 SPIKE DOMAIN ANTIBODY RESULT: >250 U/ML — HIGH
CREAT SERPL-MCNC: 0.63 MG/DL — SIGNIFICANT CHANGE UP (ref 0.5–1.3)
ESTIMATED AVERAGE GLUCOSE: 126 MG/DL — HIGH (ref 68–114)
GLUCOSE SERPL-MCNC: 92 MG/DL — SIGNIFICANT CHANGE UP (ref 70–99)
HCT VFR BLD CALC: 37.3 % — SIGNIFICANT CHANGE UP (ref 34.5–45)
HDLC SERPL-MCNC: 47 MG/DL — LOW
HGB BLD-MCNC: 12.3 G/DL — SIGNIFICANT CHANGE UP (ref 11.5–15.5)
LIPID PNL WITH DIRECT LDL SERPL: 99 MG/DL — SIGNIFICANT CHANGE UP
MAGNESIUM SERPL-MCNC: 2.5 MG/DL — SIGNIFICANT CHANGE UP (ref 1.6–2.6)
MCHC RBC-ENTMCNC: 30.4 PG — SIGNIFICANT CHANGE UP (ref 27–34)
MCHC RBC-ENTMCNC: 33 GM/DL — SIGNIFICANT CHANGE UP (ref 32–36)
MCV RBC AUTO: 92.1 FL — SIGNIFICANT CHANGE UP (ref 80–100)
NON HDL CHOLESTEROL: 113 MG/DL — SIGNIFICANT CHANGE UP
NRBC # BLD: 0 /100 WBCS — SIGNIFICANT CHANGE UP (ref 0–0)
PHOSPHATE SERPL-MCNC: 3.2 MG/DL — SIGNIFICANT CHANGE UP (ref 2.5–4.5)
PLATELET # BLD AUTO: 278 K/UL — SIGNIFICANT CHANGE UP (ref 150–400)
POTASSIUM SERPL-MCNC: 4.1 MMOL/L — SIGNIFICANT CHANGE UP (ref 3.5–5.3)
POTASSIUM SERPL-SCNC: 4.1 MMOL/L — SIGNIFICANT CHANGE UP (ref 3.5–5.3)
RBC # BLD: 4.05 M/UL — SIGNIFICANT CHANGE UP (ref 3.8–5.2)
RBC # FLD: 13 % — SIGNIFICANT CHANGE UP (ref 10.3–14.5)
SARS-COV-2 IGG+IGM SERPL QL IA: >250 U/ML — HIGH
SARS-COV-2 IGG+IGM SERPL QL IA: POSITIVE
SODIUM SERPL-SCNC: 142 MMOL/L — SIGNIFICANT CHANGE UP (ref 135–145)
TRIGL SERPL-MCNC: 69 MG/DL — SIGNIFICANT CHANGE UP
TROPONIN I SERPL-MCNC: <0.015 NG/ML — SIGNIFICANT CHANGE UP (ref 0–0.04)
TROPONIN I SERPL-MCNC: <0.015 NG/ML — SIGNIFICANT CHANGE UP (ref 0–0.04)
TSH SERPL-MCNC: 2.93 UU/ML — SIGNIFICANT CHANGE UP (ref 0.34–4.82)
WBC # BLD: 9.16 K/UL — SIGNIFICANT CHANGE UP (ref 3.8–10.5)
WBC # FLD AUTO: 9.16 K/UL — SIGNIFICANT CHANGE UP (ref 3.8–10.5)

## 2021-08-03 PROCEDURE — 99232 SBSQ HOSP IP/OBS MODERATE 35: CPT

## 2021-08-03 RX ORDER — ATORVASTATIN CALCIUM 80 MG/1
10 TABLET, FILM COATED ORAL AT BEDTIME
Refills: 0 | Status: DISCONTINUED | OUTPATIENT
Start: 2021-08-03 | End: 2021-08-04

## 2021-08-03 RX ORDER — ALBUTEROL 90 UG/1
2 AEROSOL, METERED ORAL EVERY 6 HOURS
Refills: 0 | Status: DISCONTINUED | OUTPATIENT
Start: 2021-08-03 | End: 2021-08-04

## 2021-08-03 RX ORDER — PANTOPRAZOLE SODIUM 20 MG/1
40 TABLET, DELAYED RELEASE ORAL
Refills: 0 | Status: DISCONTINUED | OUTPATIENT
Start: 2021-08-03 | End: 2021-08-04

## 2021-08-03 RX ADMIN — ATORVASTATIN CALCIUM 10 MILLIGRAM(S): 80 TABLET, FILM COATED ORAL at 21:36

## 2021-08-03 RX ADMIN — DONEPEZIL HYDROCHLORIDE 5 MILLIGRAM(S): 10 TABLET, FILM COATED ORAL at 21:36

## 2021-08-03 RX ADMIN — PANTOPRAZOLE SODIUM 40 MILLIGRAM(S): 20 TABLET, DELAYED RELEASE ORAL at 10:06

## 2021-08-03 NOTE — CONSULT NOTE ADULT - SUBJECTIVE AND OBJECTIVE BOX
DATE OF SERVICE: 08/03/2021   Patient was seen,examined and evaluated  by me.ER evaluation, Labs and Hospital course was reviewed,    CHIEF COMPLAINT:Syncope    HPI:76 y/o female from home, walks independently, has past medical history of dementia, hypertension and HLD came to ED c/o 2 episodes of syncope.   First episode happened while at the gym  no premonitory symptoms, no head trauma. Second episode happened at home while sitting in the couch, witnessed by  who described as sudden LOC, no head trauma, no seizure like activity, no incontinence, no tongue bite. Patient denied any chest pain, palpitations, sob, dizziness or lightheadedness.     GOC: Full Code    (02 Aug 2021 20:54)      PAST MEDICAL & SURGICAL HISTORY:  Hypertension    Hypercholesteremia    No significant past surgical history        MEDICATIONS  (STANDING):  donepezil 5 milliGRAM(s) Oral at bedtime  enoxaparin Injectable 40 milliGRAM(s) SubCutaneous daily  metoprolol succinate ER 50 milliGRAM(s) Oral daily  pantoprazole    Tablet 40 milliGRAM(s) Oral before breakfast    MEDICATIONS  (PRN):      FAMILY HISTORY:    No family history of premature coronary artery disease or sudden cardiac death    SOCIAL HISTORY:  Smoking-[ ] Active  [ ] Former [ ] Non Smoker  Alcohol-[ ] Denies [ ] Social [ ] Daily  Ilicit Drug use-[ ] Denies [ ] Active user    REVIEW OF SYSTEMS:  Constitutional: [ ] fever, [ ]weight loss, [ ]fatigue   Activity [ ] Bedbound,[ ] Ambulates [ ] Unassisted[ ] Cane/Walker [ ] Assistence.  Effort tolerance:[ ] Excellent [ ] Good [ ] Fair [ ] Poor [ ]  Eyes: [ ] visual changes  Respiratory: [ ]shortness of breath;  [ ] cough, [ ]wheezing, [ ]chills, [ ]hemoptysis  Cardiovascular: [ ] chest pain, [ ]palpitations, [ ]dizziness,  [ ]leg swelling[ ]orthopnea [ ]PND  Gastrointestinal: [ ] abdominal pain, [ ]nausea, [ ]vomiting,  [ ]diarrhea,[ ]constipation  Genitourinary: [ ] dysuria, [ ] hematuria  Neurologic: [ ] headaches [ ] tremors[ ] weakness  Skin: [ ] itching, [ ]burning, [ ] rashes  Endocrine: [ ] heat or cold intolerance  Musculoskeletal: [ ] joint pain or swelling; [ ] muscle, back, or extremity pain  Psychiatric: [ ] depression, [ ]anxiety, [ ]mood swings, or [ ]difficulty sleeping  Hematologic: [ ] easy bruising, [ ] bleeding gums       [ x] All others negative	  [ ] Unable to obtain    Vital Signs Last 24 Hrs  T(C): 36.7 (03 Aug 2021 07:15), Max: 36.7 (02 Aug 2021 21:49)  T(F): 98.1 (03 Aug 2021 07:15), Max: 98.1 (03 Aug 2021 07:15)  HR: 61 (03 Aug 2021 07:15) (57 - 65)  BP: 159/76 (03 Aug 2021 07:15) (129/79 - 163/72)  BP(mean): --  RR: 18 (03 Aug 2021 07:15) (16 - 18)  SpO2: 96% (03 Aug 2021 07:15) (94% - 98%)  I&O's Summary      PHYSICAL EXAM:  General: No acute distress BMI-  HEENT: EOMI, PERRL[ ] Icteric  Neck: Supple, No JVD  Lungs: Equal air entry bilaterally; [ ] Rales [ ] Rhonchi [ ] Wheezing  Heart: Regular rate and rhythm;[ ] Murmurs-   /6 [ ] Systolic [ ] Diastolic [ ] Radiation,No rubs, or gallops  Abdomen: Nontender, bowel sounds present  Extremities: No clubbing, cyanosis, or edema[ ] Calf tenderness  Nervous system:  Alert & Oriented X3, no focal deficits  Psychiatric: Normal affect  Skin: No rashes or lesions      LABS:  08-03    142  |  105  |  14  ----------------------------<  92  4.1   |  33<H>  |  0.63    Ca    9.1      03 Aug 2021 05:59  Phos  3.2     08-03  Mg     2.5     08-03    TPro  8.7<H>  /  Alb  3.1<L>  /  TBili  0.3  /  DBili  x   /  AST  23  /  ALT  51  /  AlkPhos  132<H>  08-02    Creatinine Trend: 0.63<--, 0.64<--                        12.3   9.16  )-----------( 278      ( 03 Aug 2021 05:59 )             37.3         Lipid Panel: Cholesterol, Serum 160  HDL Cholesterol, Serum 47  Triglycerides, Serum 69    Cardiac Enzymes: CARDIAC MARKERS ( 03 Aug 2021 05:59 )  <0.015 ng/mL / x     / x     / x     / x      CARDIAC MARKERS ( 03 Aug 2021 00:25 )  <0.015 ng/mL / x     / x     / x     / x      CARDIAC MARKERS ( 02 Aug 2021 16:57 )  <0.015 ng/mL / x     / x     / x     / x                RADIOLOGY:    ECG [my interpretation]:    TELEMETRY:    ECHO:    STRESS TEST:    CATHETERIZATION:

## 2021-08-03 NOTE — PROGRESS NOTE ADULT - ASSESSMENT
Patient is a 76 y/o female from home, walks independently with PMH of dementia, hypertension and HLD. Patient presented to ED with 2 syncopal episode.  First episode happened while at the gym this am, no premonitory symptoms, no head trauma. Second episode happened at home while sitting in the couch, witnessed by  who described as sudden LOC. CT head resulting redemonstration volume loss, microvascular disease, 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 Chest x-ray resulting Biapical pleural thickening and nonspecific increased reticulonodular pattern of the upper lungs without significant change. New small nodular density overlies right upper lung. CT chest resulting Multifocal areas of airspace and reticular opacities in both lung with peripheral upper lung zone predominance. Associated bilateral traction bronchiectasis. Small tree-in-bud nodules in both lung, likely representing infectious or inflammatory process via the airway. Enlarged mediastinal and right hilar lymph nodes. CT abdomen and pelvis resulting Mild dilatation of the proximal common bile duct. If clinically indicated, MRCP may be pursued for further evaluation. No bowel obstruction. Colonic diverticulosis; limited evaluation for inflammatory changes due to lack of luminal distention. The visualized appendix appears unremarkable. Patient admitted to telemetry for syncope work up.  ECHO and stress test pending. Cardiology following.

## 2021-08-03 NOTE — PROGRESS NOTE ADULT - ATTENDING COMMENTS
no cp/sob/ [id number] and is negative  Pulm consult for CT chest finding- Dr. Sosa? sarcoid  TTE/stress per cards  dw patient and Son-Mac at length in ED

## 2021-08-03 NOTE — PROGRESS NOTE ADULT - PROBLEM SELECTOR PLAN 1
- EKG: NSR,   - serial Troponin negative  - CT head see as above  - Orthostatics negative  - f/u Echo   - f/u stress test  - Fall precautions  - Cardio Dr Mahan consulted

## 2021-08-03 NOTE — PROGRESS NOTE ADULT - SUBJECTIVE AND OBJECTIVE BOX
NP Note discussed with  Primary Attending    Patient is a 75y old  Female who presents with a chief complaint of SYNCOPE (03 Aug 2021 10:33)      INTERVAL HPI/OVERNIGHT EVENTS: Patient seen and examined at bedside. Patient comfortable in bed with family at bedside, no new complaints    MEDICATIONS  (STANDING):  donepezil 5 milliGRAM(s) Oral at bedtime  enoxaparin Injectable 40 milliGRAM(s) SubCutaneous daily  metoprolol succinate ER 50 milliGRAM(s) Oral daily  pantoprazole    Tablet 40 milliGRAM(s) Oral before breakfast    MEDICATIONS  (PRN):      __________________________________________________  REVIEW OF SYSTEMS:    CONSTITUTIONAL: No fever,   EYES: no acute visual disturbances  NECK: No pain or stiffness  RESPIRATORY: No cough; No shortness of breath  CARDIOVASCULAR: No chest pain, no palpitations  GASTROINTESTINAL: No pain. No nausea or vomiting; No diarrhea   NEUROLOGICAL: No headache or numbness, no tremors  MUSCULOSKELETAL: No joint pain, no muscle pain  GENITOURINARY: no dysuria, no frequency, no hesitancy  PSYCHIATRY: no depression , no anxiety  ALL OTHER  ROS negative        Vital Signs Last 24 Hrs  T(C): 36.6 (03 Aug 2021 11:15), Max: 36.7 (02 Aug 2021 21:49)  T(F): 97.9 (03 Aug 2021 11:15), Max: 98.1 (03 Aug 2021 07:15)  HR: 61 (03 Aug 2021 11:15) (57 - 65)  BP: 153/68 (03 Aug 2021 11:15) (129/79 - 163/72)  BP(mean): --  RR: 18 (03 Aug 2021 11:15) (16 - 18)  SpO2: 97% (03 Aug 2021 11:15) (94% - 98%)    ________________________________________________  PHYSICAL EXAM:  GENERAL: NAD  HEENT: Normocephalic;  conjunctivae and sclerae clear; moist mucous membranes;   NECK : supple  CHEST/LUNG: expiratory wheezing  HEART: S1 S2  regular; no murmurs, gallops or rubs  ABDOMEN: Soft, Nontender, Nondistended; Bowel sounds present  EXTREMITIES: no cyanosis; no edema; no calf tenderness  SKIN: warm and dry; no rash  NERVOUS SYSTEM:  Awake and alert; Oriented  to place, person and time ; no new deficits    _________________________________________________  LABS:                        12.3   9.16  )-----------( 278      ( 03 Aug 2021 05:59 )             37.3     08-03    142  |  105  |  14  ----------------------------<  92  4.1   |  33<H>  |  0.63    Ca    9.1      03 Aug 2021 05:59  Phos  3.2     08-03  Mg     2.5     08-03    TPro  8.7<H>  /  Alb  3.1<L>  /  TBili  0.3  /  DBili  x   /  AST  23  /  ALT  51  /  AlkPhos  132<H>  08-02        CAPILLARY BLOOD GLUCOSE      POCT Blood Glucose.: 196 mg/dL (02 Aug 2021 15:52)        RADIOLOGY & ADDITIONAL TESTS:  < from: CT Abdomen and Pelvis w/ IV Cont (08.02.21 @ 21:00) >  XAM:  CT ABDOMEN AND PELVIS IC                            PROCEDURE DATE:  08/02/2021          INTERPRETATION:  Exam: CT Chest With Contrast; Diagnostic  Exam date and time: 8/2/2021 8:04 PM  Age: 75 years old  Clinical indication: Other: Abnormalxray - lung opacity/opacities; Other:  Weight loss    TECHNIQUE:  Imaging protocol: Diagnostic computed tomography of the chest with contrast.  3D rendering: MIP and/or 3D reconstructed images were created by the technologist.    COMPARISON:  CR XR CHEST URGENT 8/2/2021 5:42 PM    FINDINGS:  Lungs: Multifocal areas of airspace and reticular opacities with peripheral upper lung zone predominance bilaterally. Associated bilateral traction bronchiectasis. Small tree-in-bud nodules in both lung, likely representing infectious or inflammatory process via the airway.    Pleural spaces: No pneumothorax. No pleural effusion.  Heart: Heart is not enlarged. No pericardial effusion.  Aorta: No evidence of aortic dissection or aneurysm. Aortic and coronary artery calcifications are present.  Lymph nodes: Mildly enlarged 1.1 cm precarinal lymph node. Enlarged 1.7 cm right hilar lymph node.  Bones/joints: Multi-level degenerative changes involve the thoracic spine.  Soft tissues: Unremarkable.    IMPRESSION: Multifocal areas of airspace and reticular opacities in both lung with peripheral upper lung zone predominance. Associated bilateral traction bronchiectasis. Small tree-in-bud nodules in both lung, likely representing infectious or inflammatoryprocess via the airway.    Enlarged mediastinal and right hilar lymph nodes.      =========================  PROCEDURE INFORMATION:  Exam: CT Abdomen And Pelvis With Contrast  Exam date and time: 8/2/2021 8:04 PM  Age: 75 years old  Clinical indication: Other: Abnormal xray - lung opacity/opacities; Other:  Weight loss    TECHNIQUE:  Imaging protocol: Computed tomography of the abdomen and pelvis with contrast.    COMPARISON:  CR XR CHEST URGENT 8/2/2021 5:42 PM    FINDINGS: Respiratory motion artifact limits evaluation.  Liver: Normal. No mass.  Gallbladder and bile ducts: Gallbladder appears contracted, limits evaluation. Mild dilatation of the proximal common bile duct. If clinically indicated, MRCP may be pursued for further evaluation.  Pancreas: Normal. No ductal dilation.  Spleen: Normal. No splenomegaly.  Adrenal glands: Normal. No mass.  Kidneys and ureters: Unremarkable. No hydronephrosis.  Stomach and bowel: No bowel obstruction. Colonic diverticulosis; limited evaluation for inflammatory changes due to lack of luminal distention.  Appendix: Appendix - visualized portions appear normal.    Intraperitoneal space: No free air or ascites.    Vasculature: Chronic atherosclerotic calcification of the vasculature.  Lymph nodes: No enlarged lymph nodes.  Urinary bladder: Unremarkable as visualized.  Reproductive: Uterus appears within normal limits.  Bones/joints: Chronic degenerative changes of the lumbar spine.  Soft tissues: Unremarkable.    IMPRESSION:  Mild dilatation of the proximal common bile duct. If clinically indicated, MRCP may be pursued for further evaluation.    No bowel obstruction. Colonic diverticulosis; limited evaluation for inflammatory changes due to lack of luminal distention. The visualized appendixappears unremarkable.    A preliminary report was provided by [employer].      < end of copied text >  < from: CT Chest w/ IV Cont (08.02.21 @ 21:00) >    EXAM:  CT CHEST IC                            PROCEDURE DATE:  08/02/2021          INTERPRETATION:  Exam: CT Chest With Contrast; Diagnostic  Exam date and time: 8/2/2021 8:04 PM  Age: 75 years old  Clinical indication: Other: Abnormal xray - lung opacity/opacities; Other:  Weight loss    TECHNIQUE:  Imaging protocol: Diagnostic computed tomography of the chest with contrast.  3D rendering: MIP and/or 3D reconstructed images were created by the technologist.    COMPARISON:  CR XR CHEST URGENT 8/2/2021 5:42 PM    FINDINGS:  Lungs: Multifocal areas of airspace and reticular opacities with peripheral upper lung zone predominance bilaterally. Associated bilateral traction bronchiectasis. Small tree-in-bud nodules in both lung, likely representing infectious or inflammatory process via the airway.    Pleural spaces: No pneumothorax. No pleural effusion.  Heart: Heart is not enlarged. No pericardial effusion.  Aorta: No evidence of aortic dissection or aneurysm. Aortic and coronary artery calcifications are present.  Lymph nodes: Mildly enlarged 1.1 cm precarinal lymph node. Enlarged 1.7 cm right hilar lymph node.  Bones/joints: Multi-level degenerative changes involve the thoracic spine.  Soft tissues: Unremarkable.    IMPRESSION: Multifocal areas of airspace and reticular opacities in both lung with peripheral upper lung zone predominance. Associated bilateral traction bronchiectasis. Small tree-in-bud nodules in both lung, likely representing infectious or inflammatory process via the airway.    Enlarged mediastinal and right hilar lymph nodes.      =========================  PROCEDURE INFORMATION:  Exam: CT Abdomen And Pelvis With Contrast  Exam date and time: 8/2/2021 8:04 PM  Age: 75 years old  Clinical indication: Other: Abnormal xray - lung opacity/opacities; Other:  Weight loss    TECHNIQUE:  Imaging protocol: Computed tomography of the abdomen and pelvis with contrast.    COMPARISON:  CR XR CHEST URGENT 8/2/2021 5:42 PM    FINDINGS: Respiratory motion artifact limitsevaluation.  Liver: Normal. No mass.  Gallbladder and bile ducts: Gallbladder appears contracted, limits evaluation. Mild dilatation of the proximal common bile duct. If clinically indicated, MRCP may be pursued for further evaluation.  Pancreas: Normal. No ductal dilation.  Spleen: Normal. No splenomegaly.  Adrenal glands: Normal. No mass.  Kidneys and ureters: Unremarkable. No hydronephrosis.  Stomach and bowel: No bowel obstruction. Colonic diverticulosis; limited evaluation for inflammatory changes due to lack of luminal distention.  Appendix: Appendix - visualized portions appear normal.    Intraperitoneal space: No free air or ascites.    Vasculature: Chronic atherosclerotic calcification of the vasculature.  Lymph nodes: No enlarged lymph nodes.  Urinary bladder: Unremarkable as visualized.  Reproductive: Uterus appears within normal limits.  Bones/joints: Chronic degenerative changes of the lumbar spine.  Soft tissues: Unremarkable.    IMPRESSION:  Mild dilatation of the proximal common bile duct. If clinically indicated, MRCP may be pursued for further evaluation.    No bowel obstruction. Colonic diverticulosis; limited evaluation for inflammatory changes due to lack of luminal distention. The visualized appendix appears unremarkable.    A preliminary report was provided by [employer].      < end of copied text >  < from: CT Head No Cont (08.02.21 @ 18:12) >    EXAM:  CT BRAIN                            PROCEDURE DATE:  08/02/2021          INTERPRETATION:  CLINICAL INDICATION: 75-year-old, syncope, recurrent    Technique: Noncontrast CT of the head was performed.    Multiple contiguous axial images were acquired from the skull base to the vertex without the administration of intravenous contrast. Coronal and sagittal reformations were made.    COMPARISON: Head CT, 5/31/2021    FINDINGS:  Redemonstration mild enlargement of ventricles and sulci consistent with volume loss unchanged from prior. Redemonstration nonspecific hemispheric white matter areas of low attenuation which are nonspecific likely microvascular disease with areas of ill-defined decreased ventilation likely microvascular disease along the bilateral occipital lobes, unchanged from prior with remote and age-indeterminate lacunar infarcts. There is atherosclerotic vascular calcification of the supraclinoid ICAs, MRI is more sensitive for new ischemia. No new intraparenchymal hematoma, mass effect or midline shift. No new extra-axial fluid collections are present. The basal cisterns remaining patent.    Calvarium and orbits are unchanged, with incidental biparietal thinning, (3:43), unchanged from prior (3:42), there is mild mucosal thickening in the maxillary and ethmoid sinuses, with retraction laterally of the maxillary sinus margins, and continued opacification left posterior mastoid.    IMPRESSION:    Redemonstration volume loss, microvascular disease, 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 If symptoms persist consider follow-up head CT or MR if no contraindications.    --- End of Report ---    < end of copied text >  < from: Xray Chest 1 View- PORTABLE-Urgent (08.02.21 @ 18:04) >    EXAM:  XR CHEST PORTABLE URGENT 1V                            PROCEDURE DATE:  08/02/2021          INTERPRETATION:  CLINICAL STATEMENT: Chest Pain.    TECHNIQUE: AP view of the chest.    COMPARISON: 5/31/2021    FINDINGS/  IMPRESSION:  Biapical pleural thickening and nonspecific increased reticulonodular pattern of the upper lungs without significant change. New small nodular density overlies right upper lung. Refer to CT chest exam    Heart size cannot be accurately assessed in this projection.    < end of copied text >    Imaging  Reviewed:  YES    Consultant(s) Notes Reviewed:   YES      Plan of care was discussed with patient and /or primary care giver; all questions and concerns were addressed

## 2021-08-03 NOTE — PROGRESS NOTE ADULT - PROBLEM SELECTOR PLAN 2
- see chest x-ray and CT chest as above  - outpatient work needed for further management  - started on albuterol PRN for expiratory wheezing - see chest x-ray and CT chest as above  - outpatient work needed for further management  - started on albuterol PRN for expiratory wheezing  - Pulmonology Dr. Sosa

## 2021-08-04 ENCOUNTER — TRANSCRIPTION ENCOUNTER (OUTPATIENT)
Age: 76
End: 2021-08-04

## 2021-08-04 VITALS
OXYGEN SATURATION: 97 % | DIASTOLIC BLOOD PRESSURE: 67 MMHG | SYSTOLIC BLOOD PRESSURE: 156 MMHG | HEART RATE: 63 BPM | TEMPERATURE: 97 F | RESPIRATION RATE: 18 BRPM

## 2021-08-04 LAB
ANION GAP SERPL CALC-SCNC: 2 MMOL/L — LOW (ref 5–17)
BUN SERPL-MCNC: 12 MG/DL — SIGNIFICANT CHANGE UP (ref 7–18)
CALCIUM SERPL-MCNC: 8.6 MG/DL — SIGNIFICANT CHANGE UP (ref 8.4–10.5)
CHLORIDE SERPL-SCNC: 108 MMOL/L — SIGNIFICANT CHANGE UP (ref 96–108)
CO2 SERPL-SCNC: 31 MMOL/L — SIGNIFICANT CHANGE UP (ref 22–31)
CREAT SERPL-MCNC: 0.6 MG/DL — SIGNIFICANT CHANGE UP (ref 0.5–1.3)
GLUCOSE SERPL-MCNC: 95 MG/DL — SIGNIFICANT CHANGE UP (ref 70–99)
HCT VFR BLD CALC: 36.8 % — SIGNIFICANT CHANGE UP (ref 34.5–45)
HGB BLD-MCNC: 11.7 G/DL — SIGNIFICANT CHANGE UP (ref 11.5–15.5)
MCHC RBC-ENTMCNC: 29.2 PG — SIGNIFICANT CHANGE UP (ref 27–34)
MCHC RBC-ENTMCNC: 31.8 GM/DL — LOW (ref 32–36)
MCV RBC AUTO: 91.8 FL — SIGNIFICANT CHANGE UP (ref 80–100)
NRBC # BLD: 0 /100 WBCS — SIGNIFICANT CHANGE UP (ref 0–0)
PLATELET # BLD AUTO: 256 K/UL — SIGNIFICANT CHANGE UP (ref 150–400)
POTASSIUM SERPL-MCNC: 4.1 MMOL/L — SIGNIFICANT CHANGE UP (ref 3.5–5.3)
POTASSIUM SERPL-SCNC: 4.1 MMOL/L — SIGNIFICANT CHANGE UP (ref 3.5–5.3)
RBC # BLD: 4.01 M/UL — SIGNIFICANT CHANGE UP (ref 3.8–5.2)
RBC # FLD: 13 % — SIGNIFICANT CHANGE UP (ref 10.3–14.5)
SODIUM SERPL-SCNC: 141 MMOL/L — SIGNIFICANT CHANGE UP (ref 135–145)
WBC # BLD: 9.44 K/UL — SIGNIFICANT CHANGE UP (ref 3.8–10.5)
WBC # FLD AUTO: 9.44 K/UL — SIGNIFICANT CHANGE UP (ref 3.8–10.5)

## 2021-08-04 PROCEDURE — 93018 CV STRESS TEST I&R ONLY: CPT

## 2021-08-04 PROCEDURE — 99239 HOSP IP/OBS DSCHRG MGMT >30: CPT | Mod: GC

## 2021-08-04 PROCEDURE — 99222 1ST HOSP IP/OBS MODERATE 55: CPT

## 2021-08-04 PROCEDURE — 93016 CV STRESS TEST SUPVJ ONLY: CPT

## 2021-08-04 PROCEDURE — 78452 HT MUSCLE IMAGE SPECT MULT: CPT | Mod: 26

## 2021-08-04 RX ORDER — ATORVASTATIN CALCIUM 80 MG/1
1 TABLET, FILM COATED ORAL
Qty: 30 | Refills: 0
Start: 2021-08-04 | End: 2021-09-02

## 2021-08-04 RX ORDER — ATORVASTATIN CALCIUM 80 MG/1
80 TABLET, FILM COATED ORAL AT BEDTIME
Refills: 0 | Status: DISCONTINUED | OUTPATIENT
Start: 2021-08-04 | End: 2021-08-04

## 2021-08-04 RX ORDER — ALBUTEROL 90 UG/1
2 AEROSOL, METERED ORAL
Qty: 0 | Refills: 0 | DISCHARGE
Start: 2021-08-04

## 2021-08-04 RX ORDER — ASPIRIN/CALCIUM CARB/MAGNESIUM 324 MG
1 TABLET ORAL
Qty: 30 | Refills: 0
Start: 2021-08-04 | End: 2021-09-02

## 2021-08-04 RX ORDER — ASPIRIN/CALCIUM CARB/MAGNESIUM 324 MG
81 TABLET ORAL DAILY
Refills: 0 | Status: DISCONTINUED | OUTPATIENT
Start: 2021-08-04 | End: 2021-08-04

## 2021-08-04 RX ADMIN — Medication 50 MILLIGRAM(S): at 05:32

## 2021-08-04 RX ADMIN — PANTOPRAZOLE SODIUM 40 MILLIGRAM(S): 20 TABLET, DELAYED RELEASE ORAL at 05:33

## 2021-08-04 NOTE — DISCHARGE NOTE PROVIDER - CARE PROVIDERS DIRECT ADDRESSES
,DirectAddress_Unknown,[email or web address]

## 2021-08-04 NOTE — DISCHARGE NOTE PROVIDER - PROVIDER TOKENS
PROVIDER:[TOKEN:[[id number]]],PROVIDER:[TOKEN:[[id number]]],PROVIDER:[TOKEN:[[id number]]]

## 2021-08-04 NOTE — DISCHARGE NOTE PROVIDER - ATTENDING COMMENTS
No chest pain- stress test results reviewed with Dr. Mahan- out patient follow up, asa/statin on dc  Dw Dr. Sosa- out pt follow up for work up  stable to dc home  time spent- 37 minutes

## 2021-08-04 NOTE — CONSULT NOTE ADULT - SUBJECTIVE AND OBJECTIVE BOX
CHIEF COMPLAINT:Syncope    HPI: 76 y/o female from home, walks independently, has past medical history of dementia, hypertension and HLD came to ED c/o 2 episodes of syncope.   First episode happened while at the gym  no premonitory symptoms, no head trauma. Second episode happened at home while sitting in the couch, witnessed by  who described as sudden LOC, no head trauma, no seizure like activity, no incontinence, no tongue bite. Patient denied any chest pain, palpitations, sob, dizziness or lightheadedness.     Pt reports h/o chronic dry cough. Has been told in past 2-3 months she had pneumonia, treated with abx and nebs. Cough remains persistent but minimally bothersome. Pt is active, works out 2-3 times a week. No sob or ace. Nonsmoker, no occupational or environmental known exposures. Worked at a school cafeteria. Born and raised in [place]. No pets, no birds, no seasonal allergies. NO family h/o pulmonary issues.  Pt does report unintentional weight loss with ok appetite for the past several months of about 30lb.      PAST MEDICAL & SURGICAL HISTORY:  Hypertension    Hypercholesteremia    No significant past surgical history        MEDICATIONS  (STANDING):  donepezil 5 milliGRAM(s) Oral at bedtime  enoxaparin Injectable 40 milliGRAM(s) SubCutaneous daily  metoprolol succinate ER 50 milliGRAM(s) Oral daily  pantoprazole    Tablet 40 milliGRAM(s) Oral before breakfast    MEDICATIONS  (PRN):      FAMILY HISTORY:    No family history of premature coronary artery disease or sudden cardiac death    SOCIAL HISTORY:  Smoking-[ ] Active  [ ] Former [ ] Non Smoker  Alcohol-[ ] Denies [ ] Social [ ] Daily  Ilicit Drug use-[ ] Denies [ ] Active user    REVIEW OF SYSTEMS:  Constitutional: [ ] fever, [x ]weight loss, [ ]fatigue   Activity [ ] Bedbound,[x ] Ambulates [ ] Unassisted[ ] Cane/Walker [ ] Assistence.  Effort tolerance:[ ] Excellent [x ] Good [ ] Fair [ ] Poor [ ]  Eyes: [ ] visual changes  Respiratory: [ ]shortness of breath;  [x ] cough, [ ]wheezing, [ ]chills, [ ]hemoptysis  Cardiovascular: [ ] chest pain, [ ]palpitations, [ ]dizziness,  [ ]leg swelling[ ]orthopnea [ ]PND  Gastrointestinal: [ ] abdominal pain, [ ]nausea, [ ]vomiting,  [ ]diarrhea,[ ]constipation  Genitourinary: [ ] dysuria, [ ] hematuria  Neurologic: [ ] headaches [ ] tremors[ ] weakness  Skin: [ ] itching, [ ]burning, [ ] rashes  Endocrine: [ ] heat or cold intolerance  Musculoskeletal: [ ] joint pain or swelling; [ ] muscle, back, or extremity pain  Psychiatric: [ ] depression, [ ]anxiety, [ ]mood swings, or [ ]difficulty sleeping  Hematologic: [ ] easy bruising, [ ] bleeding gums       [ x] All others negative	  [ ] Unable to obtain    ICU Vital Signs Last 24 Hrs  T(C): 36.1 (04 Aug 2021 11:26), Max: 36.7 (04 Aug 2021 04:30)  T(F): 97 (04 Aug 2021 11:26), Max: 98.1 (04 Aug 2021 04:30)  HR: 63 (04 Aug 2021 11:26) (62 - 78)  BP: 156/67 (04 Aug 2021 11:26) (115/66 - 157/88)  BP(mean): --  ABP: --  ABP(mean): --  RR: 18 (04 Aug 2021 11:26) (16 - 19)  SpO2: 97% (04 Aug 2021 11:26) (95% - 99%)      PHYSICAL EXAM:  General: No acute distress BMI-  HEENT: EOMI, PERRL[ ] Icteric  Neck: Supple, No JVD  Lungs: Equal air entry bilaterally, CTA  Heart: Regular rate and rhythm, No rubs, or gallops  Abdomen: Nontender, bowel sounds present  Extremities: No clubbing, cyanosis, or edema[ ] Calf tenderness  Nervous system:  Alert & Oriented X3, no focal deficits  Psychiatric: Normal affect  Skin: No rashes or lesions                            11.7   9.44  )-----------( 256      ( 04 Aug 2021 07:02 )             36.8   08-04    141  |  108  |  12  ----------------------------<  95  4.1   |  31  |  0.60    Ca    8.6      04 Aug 2021 07:02  Phos  3.2     08-03  Mg     2.5     08-03    TPro  8.7<H>  /  Alb  3.1<L>  /  TBili  0.3  /  DBili  x   /  AST  23  /  ALT  51  /  AlkPhos  132<H>  08-02      < from: CT Chest w/ IV Cont (08.02.21 @ 21:00) >  EXAM:  CT CHEST IC                            PROCEDURE DATE:  08/02/2021          INTERPRETATION:  Exam: CT Chest With Contrast; Diagnostic  Exam date and time: 8/2/2021 8:04 PM  Age: 75 years old  Clinical indication: Other: Abnormal xray - lung opacity/opacities; Other:  Weight loss    TECHNIQUE:  Imaging protocol: Diagnostic computed tomography of the chest with contrast.  3D rendering: MIP and/or 3D reconstructed images were created by the technologist.    COMPARISON:  CR XR CHEST URGENT 8/2/2021 5:42 PM    FINDINGS:  Lungs: Multifocal areas of airspace and reticular opacities with peripheral upper lung zone predominance bilaterally. Associated bilateral traction bronchiectasis. Small tree-in-bud nodules in both lung, likely representing infectious or inflammatory process via the airway.    Pleural spaces: No pneumothorax. No pleural effusion.  Heart: Heart is not enlarged. No pericardial effusion.  Aorta: No evidence of aortic dissection or aneurysm. Aortic and coronary artery calcifications are present.  Lymph nodes: Mildly enlarged 1.1 cm precarinal lymph node. Enlarged 1.7 cm right hilar lymph node.  Bones/joints: Multi-level degenerative changes involve the thoracic spine.  Soft tissues: Unremarkable.    IMPRESSION: Multifocal areas of airspace and reticular opacities in both lung with peripheral upper lung zone predominance. Associated bilateral traction bronchiectasis. Small tree-in-bud nodules in both lung, likely representing infectious or inflammatory process via the airway.    Enlarged mediastinal and right hilar lymph nodes.      =========================  PROCEDURE INFORMATION:  Exam: CT Abdomen And Pelvis With Contrast  Exam date and time: 8/2/2021 8:04 PM  Age: 75 years old  Clinical indication: Other: Abnormal xray - lung opacity/opacities; Other:  Weight loss    TECHNIQUE:  Imaging protocol: Computed tomography of the abdomen and pelvis with contrast.    COMPARISON:  CR XR CHEST URGENT 8/2/2021 5:42 PM    FINDINGS: Respiratory motion artifact limitsevaluation.  Liver: Normal. No mass.  Gallbladder and bile ducts: Gallbladder appears contracted, limits evaluation. Mild dilatation of the proximal common bile duct. If clinically indicated, MRCP may be pursued for further evaluation.  Pancreas: Normal. No ductal dilation.  Spleen: Normal. No splenomegaly.  Adrenal glands: Normal. No mass.  Kidneys and ureters: Unremarkable. No hydronephrosis.  Stomach and bowel: No bowel obstruction. Colonic diverticulosis; limited evaluation for inflammatory changes due to lack of luminal distention.  Appendix: Appendix - visualized portions appear normal.    Intraperitoneal space: No free air or ascites.    Vasculature: Chronic atherosclerotic calcification of the vasculature.  Lymph nodes: No enlarged lymph nodes.  Urinary bladder: Unremarkable as visualized.  Reproductive: Uterus appears within normal limits.  Bones/joints: Chronic degenerative changes of the lumbar spine.  Soft tissues: Unremarkable.    IMPRESSION:  Mild dilatation of the proximal common bile duct. If clinically indicated, MRCP may be pursued for further evaluation.    No bowel obstruction. Colonic diverticulosis; limited evaluation for inflammatory changes due to lack of luminal distention. The visualized appendix appears unremarkable.    A preliminary report was provided by [employer].    --- End of Report ---            YAYO MORGAN MD; Attending Radiologist  This document has been electronically signed. Aug  3 2021  9:36AM    < end of copied text >

## 2021-08-04 NOTE — PROGRESS NOTE ADULT - TIME BILLING
- Review of records, telemetry, vital signs and daily labs.   - General and cardiovascular physical examination.  - Generation of cardiovascular treatment plan.  - Coordination of care.      Patient was seen and examined by me on 08/04/2021,interim events noted,labs and radiology studies reviewed.  Edgra Mahan MD,FACC.  [address] [place], [place] [id number].  [phone]

## 2021-08-04 NOTE — DISCHARGE NOTE PROVIDER - NSDCMRMEDTOKEN_GEN_ALL_CORE_FT
donepezil 5 mg oral tablet: 1 tab(s) orally once a day (at bedtime)  metoprolol succinate 50 mg oral tablet, extended release: 1 tab(s) orally once a day  phenazopyridine 200 mg oral tablet: 1 tab(s) orally 3 times a day (after meals)  pravastatin 20 mg oral tablet: 1 tab(s) orally once a day   albuterol 90 mcg/inh inhalation aerosol: 2 puff(s) inhaled every 6 hours, As needed, Wheezing  donepezil 5 mg oral tablet: 1 tab(s) orally once a day (at bedtime)  metoprolol succinate 50 mg oral tablet, extended release: 1 tab(s) orally once a day  phenazopyridine 200 mg oral tablet: 1 tab(s) orally 3 times a day (after meals)  pravastatin 20 mg oral tablet: 1 tab(s) orally once a day   albuterol 90 mcg/inh inhalation aerosol: 2 puff(s) inhaled every 6 hours, As needed, Wheezing  aspirin 81 mg oral tablet, chewable: 1 tab(s) orally once a day  atorvastatin 80 mg oral tablet: 1 tab(s) orally once a day (at bedtime)  donepezil 5 mg oral tablet: 1 tab(s) orally once a day (at bedtime)  metoprolol succinate 50 mg oral tablet, extended release: 1 tab(s) orally once a day  phenazopyridine 200 mg oral tablet: 1 tab(s) orally 3 times a day (after meals)

## 2021-08-04 NOTE — DISCHARGE NOTE PROVIDER - CARE PROVIDER_API CALL
Edgar Mahan)  Cardiology  [address] [place] [address]  Phone: [phone]  Fax: [phone]  Follow Up Time:     Emmy Sosa)  Critical Care Medicine; Pulmonary Disease  Medicine at [facility] [address] [place] [address]  Phone: [phone]  Fax: [phone]  Follow Up Time:     Daljit Caldwell  INTERNAL MEDICINE  [address] [place] [address]  Phone: [phone]  Fax: [phone]  Follow Up Time:

## 2021-08-04 NOTE — DISCHARGE NOTE PROVIDER - NSDCCPCAREPLAN_GEN_ALL_CORE_FT
PRINCIPAL DISCHARGE DIAGNOSIS  Diagnosis: Syncope, unspecified syncope type  Assessment and Plan of Treatment: Keep yourself hydrated.  You came in after an episode of  lightheadedness, syncope and collapse  You were admitted to exclude any neurological or cardiological causes.   ECHO and stress test were normal.   You are advised to follow up with your PCP in 1 week.  Your CT head was negative for any bleed.   Your orthostatic vitals were negative.   You are recommended to increase your fluids intake and maintain adequate hydration and do not rise from a seated position too quickly, as this could cause your blood pressure to drop (Sit for 2-3 minutes before standing or walking from lying down position)        SECONDARY DISCHARGE DIAGNOSES  Diagnosis: Lung nodule seen on imaging study  Assessment and Plan of Treatment: Lung nodule seen on imaging study  Follow up with Dr. Sosa, the pulmonologist, outpatient  Chest Xray showed new small nodular density, overlying the right upper lung.  CT Chest shows bilateral traction bronchiectasis. Small tree in bud nodules in both lungs, likely representing infectious or inflammatory process via the airway.     PRINCIPAL DISCHARGE DIAGNOSIS  Diagnosis: Syncope, unspecified syncope type  Assessment and Plan of Treatment: Follow up with your cardiologist, Dr. Mahan, outpatient.   Keep yourself hydrated. Be careful when you are exerting yourself.  You came in after an episode of  lightheadedness, syncope and collapse  You were admitted to exclude any neurological or cardiological causes.   ECHO was normal.  Stress test showed a small, predominantly reversible, mild to moderate intensity defect in the mid anteroseptal wall consistent with ischemia.   You are advised to follow up with your PCP in 1 week.  Your CT head was negative for any bleed.   Your orthostatic vitals were negative.   You are recommended to increase your fluids intake and maintain adequate hydration and do not rise from a seated position too quickly, as this could cause your blood pressure to drop (Sit for 2-3 minutes before standing or walking from lying down position). Also, do not exert your self too much as that might cause loss of conciousness in certain cases.        SECONDARY DISCHARGE DIAGNOSES  Diagnosis: Lung nodule seen on imaging study  Assessment and Plan of Treatment: Lung nodule seen on imaging study  Follow up with Dr. Sosa, the pulmonologist, outpatient  Chest Xray showed new small nodular density, overlying the right upper lung.  CT Chest shows bilateral traction bronchiectasis. Small tree in bud nodules in both lungs, likely representing infectious or inflammatory process via the airway.     PRINCIPAL DISCHARGE DIAGNOSIS  Diagnosis: Syncope, unspecified syncope type  Assessment and Plan of Treatment: Follow up with your cardiologist, Dr. Mahan, outpatient.   Keep yourself hydrated. Be careful when you are exerting yourself.  You came in after an episode of  lightheadedness, syncope and collapse  You were admitted to exclude any neurological or cardiological causes.   ECHO was normal.  Stress test showed a small, predominantly reversible, mild to moderate intensity defect in the mid anteroseptal wall consistent with ischemia.   You are advised to follow up with Dr Mahan in 1 week.  Your CT head was negative for any bleed.   Your orthostatic vitals were negative.   You are recommended to increase your fluids intake and maintain adequate hydration and do not rise from a seated position too quickly, as this could cause your blood pressure to drop (Sit for 2-3 minutes before standing or walking from lying down position). Also, do not exert your self too much as that might cause loss of conciousness in certain cases.        SECONDARY DISCHARGE DIAGNOSES  Diagnosis: Lung nodule seen on imaging study  Assessment and Plan of Treatment: Lung nodule seen on imaging study  Follow up with Dr. Sosa, the pulmonologist, outpatient  Chest Xray showed new small nodular density, overlying the right upper lung.  CT Chest shows bilateral traction bronchiectasis. Small tree in bud nodules in both lungs, likely representing infectious or inflammatory process via the airway.    Diagnosis: Hypertension  Assessment and Plan of Treatment: Your blood pressure was well-controlled during your admission. Continue with your current regimen of anti-hypertensive medications as mentioned in your discharge summary and ensure that you follow up with your primary care provider for further recommendations and monitoring.

## 2021-08-04 NOTE — DISCHARGE NOTE NURSING/CASE MANAGEMENT/SOCIAL WORK - PATIENT PORTAL LINK FT
You can access the FollowMyHealth Patient Portal offered by [facility] by registering at the following website: [email or web address]. By joining [employer]’s FollowMyHealth portal, you will also be able to view your health information using other applications (apps) compatible with our system.

## 2021-08-04 NOTE — CONSULT NOTE ADULT - ASSESSMENT
75F with persistent dry cough and weight loss.  CT chest imaging personally reviewed. Multifocal areas of airspace and reticular opacities in both lung with peripheral upper lung zone predominance. Associated bilateral traction bronchiectasis. Small tree-in-bud nodules in both lung. Mediastinal adenopathy with 1.7cm right hilar LN.  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  Can get prn albuterol MDI and sputum collection bottle.    Pt to call my office for outpt appointment.    Thank you for this consult.

## 2021-08-04 NOTE — PROGRESS NOTE ADULT - SUBJECTIVE AND OBJECTIVE BOX
DATE OF SERVICE:  08/04/2021  Patient was seen and examined on 08/04/2021     .Interim events noted.Consultant notes ,Labs,Telemetry reviewed by me    PRESENTING CC:Syncope    HPI and HOSPITAL COURSE: HPI:  76 y/o female from home, walks independently, has past medical history of dementia, hypertension and HLD came to ED c/o 2 episodes of syncope. First episode happened while at the gym this am, no premonitory symptoms, no head trauma. Second episode happened at home while sitting in the couch, witnessed by  who described as sudden LOC, no head trauma, no seizure like activity, no incontinence, no tongue bite. Patient denied any chest pain, palpitations, sob, dizziness or lightheadedness. Of note, patient refers unintentional weight loss approximately 30 lbs, pending colonoscopy and mammogram as outpatient.        INTERIM EVENTS:Awake alert c/o nausea no chest pain or dyspnea       PMH -reviewed admission note, no change since admission  Heart Failure: Acute [ ] Chronic [ ] Acute on Chronic [ ] Diastolic [ ] Systolic [ ] Combined Systolic and Diastolic[ ]  DE[ ]  ATN[ ]  CKD I [ ] CKDII [ ] CKD III [ ] CKD IV [ ] CKD V [ ] ESRD[ ]  HTN[ ] CVA[ ] DM[ ] COPD[ ] COVID[ ] AF[ ]  PPM[ ] ICD[ ]    MEDICATIONS  (STANDING):  atorvastatin 10 milliGRAM(s) Oral at bedtime  donepezil 5 milliGRAM(s) Oral at bedtime  enoxaparin Injectable 40 milliGRAM(s) SubCutaneous daily  metoprolol succinate ER 50 milliGRAM(s) Oral daily  pantoprazole    Tablet 40 milliGRAM(s) Oral before breakfast    MEDICATIONS  (PRN):  ALBUTerol    90 MICROgram(s) HFA Inhaler 2 Puff(s) Inhalation every 6 hours PRN Wheezing            REVIEW OF SYSTEMS:  Constitutional: [ ] fever, [ ]weight loss,  [x ]fatigue  Eyes: [ ] visual changes  Respiratory: [ ]shortness of breath;  [ ] cough, [ ]wheezing, [ ]chills, [ ]hemoptysis  Cardiovascular: [ ] chest pain, [ ]palpitations, [ ]dizziness,  [ ]leg swelling[ ]orthopnea[ ]PND  Gastrointestinal: [ ] abdominal pain, [x ]nausea, [ ]vomiting,  [ ]diarrhea [ ]Constipation [ ]Melena  Genitourinary: [ ] dysuria, [ ] hematuria [ ]Aaron  Neurologic: [ ] headaches [ ] tremors[ ]weakness [ ]Paralysis Right[ ] Left[ ]  Skin: [ ] itching, [ ]burning, [ ] rashes  Endocrine: [ ] heat or cold intolerance  Musculoskeletal: [ ] joint pain or swelling; [ ] muscle, back, or extremity pain  Psychiatric: [ ] depression, [ ]anxiety, [ ]mood swings, or [ ]difficulty sleeping  Hematologic: [ ] easy bruising, [ ] bleeding gums    [x] All remaining systems negative except as per above.   [ ]Unable to obtain.    Vital Signs Last 24 Hrs  T(C): 36.6 (04 Aug 2021 07:56), Max: 36.7 (04 Aug 2021 04:30)  T(F): 97.9 (04 Aug 2021 07:56), Max: 98.1 (04 Aug 2021 04:30)  HR: 65 (04 Aug 2021 07:56) (61 - 78)  BP: 123/55 (04 Aug 2021 07:56) (115/66 - 157/88)  BP(mean): --  RR: 18 (04 Aug 2021 07:56) (16 - 19)  SpO2: 96% (04 Aug 2021 07:56) (95% - 99%)  I&O's Summary      PHYSICAL EXAM:  General: No acute distress BMI-  HEENT: EOMI, PERRL  Neck: Supple, [ ] JVD  Lungs: Equal air entry bilaterally; [ ] rales [ ] wheezing [ ] rhonchi  Heart: Regular rate and rhythm; [ ] murmur   /6 [ ] systolic [ ] diastolic [ ] radiation[ ] rubs [ ]  gallops  Abdomen: Nontender, bowel sounds present  Extremities: No clubbing, cyanosis, [ ] edema [ ]Pulses  equal and intact  Nervous system:  Alert & Oriented X3, no focal deficits  Psychiatric: Normal affect  Skin: No rashes or lesions    LABS:  08-04    141  |  108  |  12  ----------------------------<  95  4.1   |  31  |  0.60    Ca    8.6      04 Aug 2021 07:02  Phos  3.2     08-03  Mg     2.5     08-03    TPro  8.7<H>  /  Alb  3.1<L>  /  TBili  0.3  /  DBili  x   /  AST  23  /  ALT  51  /  AlkPhos  132<H>  08-02    Creatinine Trend: 0.60<--, 0.63<--, 0.64<--                        11.7   9.44  )-----------( 256      ( 04 Aug 2021 07:02 )             36.8         Cardiac Enzymes: CARDIAC MARKERS ( 03 Aug 2021 05:59 )  <0.015 ng/mL / x     / x     / x     / x      CARDIAC MARKERS ( 03 Aug 2021 00:25 )  <0.015 ng/mL / x     / x     / x     / x      CARDIAC MARKERS ( 02 Aug 2021 16:57 )  <0.015 ng/mL / x     / x     / x     / x                RADIOLOGY:    ECG [my interpretation]:    TELEMETRY:Reviewed monitor tracings-    ECHO:    STRESS TEST:    CATHETERIZATION:      IMPRESSION AND PLAN:       DATE OF SERVICE:  08/04/2021  Patient was seen and examined on 08/04/2021     .Interim events noted.Consultant notes ,Labs,Telemetry reviewed by me    PRESENTING CC:Syncope    HPI and HOSPITAL COURSE: HPI:  74 y/o female from home, walks independently, has past medical history of dementia, hypertension and HLD came to ED c/o 2 episodes of syncope. First episode happened while at the gym this am, no premonitory symptoms, no head trauma. Second episode happened at home while sitting in the couch, witnessed by  who described as sudden LOC, no head trauma, no seizure like activity, no incontinence, no tongue bite. Patient denied any chest pain, palpitations, sob, dizziness or lightheadedness. Of note, patient refers unintentional weight loss approximately 30 lbs, pending colonoscopy and mammogram as outpatient.        INTERIM EVENTS:Awake alert c/o nausea no chest pain or dyspnea       PMH -reviewed admission note, no change since admission  Heart Failure: Acute [ ] Chronic [ ] Acute on Chronic [ ] Diastolic [ ] Systolic [ ] Combined Systolic and Diastolic[ ]  DE[ ]  ATN[ ]  CKD I [ ] CKDII [ ] CKD III [ ] CKD IV [ ] CKD V [ ] ESRD[ ]  HTN[ ] CVA[ ] DM[ ] COPD[ ] COVID[ ] AF[ ]  PPM[ ] ICD[ ]    MEDICATIONS  (STANDING):  atorvastatin 10 milliGRAM(s) Oral at bedtime  donepezil 5 milliGRAM(s) Oral at bedtime  enoxaparin Injectable 40 milliGRAM(s) SubCutaneous daily  metoprolol succinate ER 50 milliGRAM(s) Oral daily  pantoprazole    Tablet 40 milliGRAM(s) Oral before breakfast    MEDICATIONS  (PRN):  ALBUTerol    90 MICROgram(s) HFA Inhaler 2 Puff(s) Inhalation every 6 hours PRN Wheezing            REVIEW OF SYSTEMS:  Constitutional: [ ] fever, [ ]weight loss,  [x ]fatigue  Eyes: [ ] visual changes  Respiratory: [ ]shortness of breath;  [ ] cough, [ ]wheezing, [ ]chills, [ ]hemoptysis  Cardiovascular: [ ] chest pain, [ ]palpitations, [ ]dizziness,  [ ]leg swelling[ ]orthopnea[ ]PND  Gastrointestinal: [ ] abdominal pain, [x ]nausea, [ ]vomiting,  [ ]diarrhea [ ]Constipation [ ]Melena  Genitourinary: [ ] dysuria, [ ] hematuria [ ]Aaron  Neurologic: [ ] headaches [ ] tremors[ ]weakness [ ]Paralysis Right[ ] Left[ ]  Skin: [ ] itching, [ ]burning, [ ] rashes  Endocrine: [ ] heat or cold intolerance  Musculoskeletal: [ ] joint pain or swelling; [ ] muscle, back, or extremity pain  Psychiatric: [ ] depression, [ ]anxiety, [ ]mood swings, or [ ]difficulty sleeping  Hematologic: [ ] easy bruising, [ ] bleeding gums    [x] All remaining systems negative except as per above.   [ ]Unable to obtain.    Vital Signs Last 24 Hrs  T(C): 36.6 (04 Aug 2021 07:56), Max: 36.7 (04 Aug 2021 04:30)  T(F): 97.9 (04 Aug 2021 07:56), Max: 98.1 (04 Aug 2021 04:30)  HR: 65 (04 Aug 2021 07:56) (61 - 78)  BP: 123/55 (04 Aug 2021 07:56) (115/66 - 157/88)  RR: 18 (04 Aug 2021 07:56) (16 - 19)  SpO2: 96% (04 Aug 2021 07:56) (95% - 99%)  I&O's Summary      PHYSICAL EXAM:  General: No acute distress BMI-18  HEENT: EOMI, PERRL  Neck: Supple, [ ] JVD  Lungs: Equal air entry bilaterally; [ ] rales [ ] wheezing [ ] rhonchi  Heart: Regular rate and rhythm; [x ] murmur  2 /6 [x ] systolic [ ] diastolic [ ] radiation[ ] rubs [ ]  gallops  Abdomen: Nontender, bowel sounds present  Extremities: No clubbing, cyanosis, [ ] edema [ ]Pulses  equal and intact  Nervous system:  Alert & Oriented X3, no focal deficits  Psychiatric: Normal affect  Skin: No rashes or lesions    LABS:  08-04    141  |  108  |  12  ----------------------------<  95  4.1   |  31  |  0.60    Ca    8.6      04 Aug 2021 07:02  Phos  3.2     08-03  Mg     2.5     08-03    TPro  8.7<H>  /  Alb  3.1<L>  /  TBili  0.3  /  DBili  x   /  AST  23  /  ALT  51  /  AlkPhos  132<H>  08-02    Creatinine Trend: 0.60<--, 0.63<--, 0.64<--                        11.7   9.44  )-----------( 256      ( 04 Aug 2021 07:02 )             36.8         Cardiac Enzymes: CARDIAC MARKERS ( 03 Aug 2021 05:59 )  <0.015 ng/mL / x     / x     / x     / x      CARDIAC MARKERS ( 03 Aug 2021 00:25 )  <0.015 ng/mL / x     / x     / x     / x      CARDIAC MARKERS ( 02 Aug 2021 16:57 )  <0.015 ng/mL / x     / x     / x     / x                RADIOLOGY:  CT CHEST IC  IMPRESSION:  Mild dilatation of the proximal common bile duct. If clinically indicated, MRCP may be pursued for further evaluation.  No bowel obstruction. Colonic diverticulosis; limited evaluation for inflammatory changes due to lack of luminal distention. The visualized appendix appears unremarkable.        ECG [my interpretation]:Sinus rhythm LVH        IMPRESSION AND PLAN:  74 y/o female from home, walks independently with PMH of dementia, hypertension and HLD.   Patient presented to ED with 2 syncopal episode.    First episode happened while at the gym this am, no premonitory symptoms, no head trauma. Second episode happened at home while sitting in the couch, witnessed by  who described as sudden LOC.   CT head resulting redemonstration volume loss, microvascular disease, 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   Chest x-ray resulting Biapical pleural thickening and nonspecific increased reticulonodular pattern of the upper lungs without significant change.   New small nodular density overlies right upper lung.     CT chest resulting Multifocal areas of airspace and reticular opacities in both lung with peripheral upper lung zone predominance. Associated bilateral traction bronchiectasis. Small tree-in-bud nodules in both lung, likely representing infectious or inflammatory process via the airway. Enlarged mediastinal and right hilar lymph nodes.     CT abdomen and pelvis resulting Mild dilatation of the proximal common bile duct. If clinically indicated, MRCP may be pursued for further evaluation. No bowel obstruction. Colonic diverticulosis; limited evaluation for inflammatory changes due to lack of luminal distention. The visualized appendix appears unremarkable.     Problem/Plan - 1:  ·  Problem: Syncope, unspecified syncope type.  Plan: - EKG: NSR,   - serial Troponin negative  - CT head see as above  - Orthostatics negative  -TTE  -Ischemic work-up NST today    Problem/Plan - 2:  ·  Problem: Lung nodule seen on imaging study.  Plan: - see chest x-ray and CT chest as above  - outpatient work needed for further management    Problem/Plan - 3:  ·  Problem: Hypercholesteremia.  Plan: - c/w atorvastatin.     Problem/Plan - 4:  ·  Problem: Hypertension.  Plan: - C/w metoprolol  - controlled.     Problem/Plan - 5:  ·  Problem: Prophylactic measure.  Plan: IMPROVE VTE Individual Risk Assessment  RISK                                                                Points  [  ] Previous VTE                                                  3  [  ] Thrombophilia                                               2  [  ] Lower limb paralysis                                      2        (unable to hold up >15 seconds)    [  ] Current Cancer                                              2         (within 6 months)  [  ] Immobilization > 24 hrs                                1  [  ] ICU/CCU stay > 24 hours                              1  [  ] Age > 60                                                      1  IMPROVE VTE Score ____2_____    PPI for GI prophylaxis  Lovenox for DVT PPX.

## 2021-08-04 NOTE — DISCHARGE NOTE PROVIDER - HOSPITAL COURSE
Patient is a 76 y/o female from home, walks independently with PMH of dementia, hypertension and HLD. Patient presented to ED with 2 syncopal episode.  First episode happened while at the gym this am, no premonitory symptoms, no head trauma. Second episode happened at home while sitting in the couch, witnessed by  who described as sudden LOC. CT head resulting redemonstration volume loss, microvascular disease, 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 Chest x-ray resulting Biapical pleural thickening and nonspecific increased reticulonodular pattern of the upper lungs without significant change. New small nodular density overlies right upper lung. CT chest resulting Multifocal areas of airspace and reticular opacities in both lung with peripheral upper lung zone predominance. Associated bilateral traction bronchiectasis. Small tree-in-bud nodules in both lung, likely representing infectious or inflammatory process via the airway. Enlarged mediastinal and right hilar lymph nodes. CT abdomen and pelvis resulting Mild dilatation of the proximal common bile duct. If clinically indicated, MRCP may be pursued for further evaluation. No bowel obstruction. Colonic diverticulosis; limited evaluation for inflammatory changes due to lack of luminal distention. The visualized appendix appears unremarkable. Patient admitted to telemetry for syncope work up.  ECHO and stress test pending. Cardiology following.  Orthostatics negative  EKG: NSR  serial Troponin negative     Patient is a 76 y/o female from home, walks independently with PMH of dementia, hypertension and HLD. Patient presented to ED with 2 syncopal episode.  First episode happened while at the gym this am, no premonitory symptoms, no head trauma. Second episode happened at home while sitting in the couch, witnessed by  who described as sudden LOC. CT head resulting redemonstration volume loss, microvascular disease, 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 Chest x-ray resulting Biapical pleural thickening and nonspecific increased reticulonodular pattern of the upper lungs without significant change. New small nodular density overlies right upper lung. CT chest resulting Multifocal areas of airspace and reticular opacities in both lung with peripheral upper lung zone predominance. Associated bilateral traction bronchiectasis. Small tree-in-bud nodules in both lung, likely representing infectious or inflammatory process via the airway. Enlarged mediastinal and right hilar lymph nodes. CT abdomen and pelvis resulting Mild dilatation of the proximal common bile duct. If clinically indicated, MRCP may be pursued for further evaluation. No bowel obstruction. Colonic diverticulosis; limited evaluation for inflammatory changes due to lack of luminal distention. The visualized appendix appears unremarkable. Patient admitted to telemetry for syncope work up. EKG: NSR. Serial Troponin negative. Stress test shows small reversible defect concerning for ischemia. He was evaluated by cardiologist, does not need acute intervention.  Will follow up outpatient in 1 week. Patient also needs to f/u with pulmonologist.      Given patient's improved clinical status and current hemodynamic stability, decision was made to discharge. Discussed with attending  Please refer to patient's complete medical chart with documents for a full hospital course, for this is only a brief summary.

## 2021-08-10 ENCOUNTER — APPOINTMENT (OUTPATIENT)
Dept: PULMONOLOGY | Facility: CLINIC | Age: 76
End: 2021-08-10
Payer: MEDICARE

## 2021-08-10 DIAGNOSIS — J47.9 BRONCHIECTASIS, UNCOMPLICATED: ICD-10-CM

## 2021-08-10 DIAGNOSIS — R63.4 ABNORMAL WEIGHT LOSS: ICD-10-CM

## 2021-08-10 PROCEDURE — ZZZZZ: CPT

## 2021-08-10 PROCEDURE — 99214 OFFICE O/P EST MOD 30 MIN: CPT

## 2021-08-10 RX ORDER — ALBUTEROL SULFATE 90 UG/1
108 (90 BASE) AEROSOL, METERED RESPIRATORY (INHALATION)
Qty: 1 | Refills: 3 | Status: ACTIVE | COMMUNITY
Start: 2021-08-10 | End: 1900-01-01

## 2021-08-10 RX ORDER — ALBUTEROL SULFATE 2.5 MG/3ML
(2.5 MG/3ML) SOLUTION RESPIRATORY (INHALATION)
Qty: 1 | Refills: 3 | Status: ACTIVE | COMMUNITY
Start: 2021-08-10 | End: 1900-01-01

## 2021-08-11 PROBLEM — R63.4 WEIGHT LOSS: Status: ACTIVE | Noted: 2021-04-12

## 2021-08-11 NOTE — HISTORY OF PRESENT ILLNESS
[TextBox_4] : 75F from [place], here for f/u after hospitalization for CARUSO, which improved spontaneously.\par Pt ha a h/o bronchiectasis and uses nebulizer at home 1-2 times a day but overall denies sob or caruso and only minimal cough without any phlegm production. She has been treated with multiple abx over last 6 months for persistent chronic cough which is currently much improved. She also noted significant weight loss over past few months and is undergoing workup with her PMD for that. No night sweats, no fevers or chills. NOnsmoker, no h/o occupational exposure. Has lived in [place] for many years.\par

## 2021-08-11 NOTE — ASSESSMENT
[FreeTextEntry1] : 75F with bronchiectasis and tree in bud changes on CT scan, PFT with mild obstruction and intermittent dry cough. Weight loss. This could be manifestation of IBRAHIMA. Pt does not produce phlegm for testing. We discussed bronchoscopic evaluation as treatment maybe indicated if she is having systemic symptoms and/or worsening pulmonary function. Currently appears stable from pulmonary standpoint.\par Pt would like conservative management for now.\par Cont albuterol nebs prn\par f/u in 3 months\par \par

## 2021-08-11 NOTE — PROCEDURE
[FreeTextEntry1] : 8/10/21 PFT:\par mild obstruction\par mild airtrapping\par moderate diffusion defect\par

## 2021-10-12 ENCOUNTER — APPOINTMENT (OUTPATIENT)
Dept: NEUROLOGY | Facility: CLINIC | Age: 76
End: 2021-10-12
Payer: MEDICARE

## 2021-10-12 VITALS
HEIGHT: 64 IN | OXYGEN SATURATION: 96 % | WEIGHT: 108 LBS | BODY MASS INDEX: 18.44 KG/M2 | TEMPERATURE: 97.3 F | SYSTOLIC BLOOD PRESSURE: 151 MMHG | DIASTOLIC BLOOD PRESSURE: 77 MMHG | HEART RATE: 61 BPM

## 2021-10-12 DIAGNOSIS — R56.9 UNSPECIFIED CONVULSIONS: ICD-10-CM

## 2021-10-12 PROCEDURE — 99215 OFFICE O/P EST HI 40 MIN: CPT

## 2021-10-12 RX ORDER — LEVETIRACETAM 500 MG/1
500 TABLET, FILM COATED ORAL TWICE DAILY
Qty: 180 | Refills: 3 | Status: ACTIVE | COMMUNITY
Start: 2021-10-12 | End: 1900-01-01

## 2021-10-13 NOTE — PHYSICAL EXAM
[Person] : oriented to person [Place] : oriented to place [Time] : oriented to time [Concentration Intact] : normal concentrating ability [Visual Intact] : visual attention was ~T not ~L decreased [Naming Objects] : no difficulty naming common objects [Repeating Phrases] : no difficulty repeating a phrase [Writing A Sentence] : no difficulty writing a sentence [Fluency] : fluency intact [Comprehension] : comprehension intact [Reading] : reading intact [Past History] : adequate knowledge of personal past history [Cranial Nerves Optic (II)] : visual acuity intact bilaterally,  visual fields full to confrontation, pupils equal round and reactive to light [Cranial Nerves Trigeminal (V)] : facial sensation intact symmetrically [Cranial Nerves Oculomotor (III)] : extraocular motion intact [Cranial Nerves Facial (VII)] : face symmetrical [Cranial Nerves Vestibulocochlear (VIII)] : hearing was intact bilaterally [Cranial Nerves Glossopharyngeal (IX)] : tongue and palate midline [Cranial Nerves Accessory (XI - Cranial And Spinal)] : head turning and shoulder shrug symmetric [Cranial Nerves Hypoglossal (XII)] : there was no tongue deviation with protrusion [Motor Tone] : muscle tone was normal in all four extremities [Motor Strength] : muscle strength was normal in all four extremities [No Muscle Atrophy] : normal bulk in all four extremities [Sensation Tactile Decrease] : light touch was intact [Abnormal Walk] : normal gait [Balance] : balance was intact [2+] : Ankle jerk left 2+ [Sclera] : the sclera and conjunctiva were normal [PERRL With Normal Accommodation] : pupils were equal in size, round, reactive to light, with normal accommodation [Extraocular Movements] : extraocular movements were intact [Outer Ear] : the ears and nose were normal in appearance [Oropharynx] : the oropharynx was normal [Past-pointing] : there was no past-pointing [Tremor] : no tremor present [Plantar Reflex Right Only] : normal on the right [Plantar Reflex Left Only] : normal on the left

## 2021-10-13 NOTE — DISCUSSION/SUMMARY
[FreeTextEntry1] : I reviewed the previous MRI scan of the head which suggests cerebral amyloid angiopathy with strokes and hemorrhages.  It is possible that these episodes that she suffered may represent small microhemorrhages occurring in the brain or seizures.  Recommend MRI scan of the head with and without contrast.  CMP and CBC in preparation for the MRI scan.  Recommend EEG to investigate seizures.  If the 1 hour EEG is negative she may need a prolonged 72-hour ambulatory EEG.  She may begin levetiracetam after the testing and follow-up.  Because her daughter lives in [facility] and has to come for appointments I shall try and communicate by telephone when the results of the MRI and EEG become available.\par \par We discussed the laws prohibiting driving for 6 months after a seizure.  For the 6 months she has to be seizure-free before she can resume driving.  I shall review her condition after 6 months before advising that she may safely continue driving in view of the MRI scan that shows a progressive disease.

## 2021-10-13 NOTE — HISTORY OF PRESENT ILLNESS
[FreeTextEntry1] : Accompanied by her daughter who is a  in [facility], she presents for a new complaint.  On several occasions in the past 2 months she has been observed to lose consciousness.  The first time it may have occurred was when she was exercising in the gym and collapsed.  This was witnessed.  She rested and then drove home.  On the last occasion she was witnessed by her daughter who saw her right hand stiffen and shake slightly during the episode.\par \par She has not been driving since the last episode.

## 2021-10-23 ENCOUNTER — INPATIENT (INPATIENT)
Facility: HOSPITAL | Age: 76
LOS: 1 days | Discharge: HOME | DRG: 191 | End: 2021-10-25
Attending: INTERNAL MEDICINE | Admitting: INTERNAL MEDICINE
Payer: COMMERCIAL

## 2021-10-23 VITALS
HEIGHT: 65 IN | SYSTOLIC BLOOD PRESSURE: 154 MMHG | OXYGEN SATURATION: 98 % | DIASTOLIC BLOOD PRESSURE: 80 MMHG | TEMPERATURE: 98 F | WEIGHT: 158.95 LBS | HEART RATE: 102 BPM | RESPIRATION RATE: 18 BRPM

## 2021-10-23 DIAGNOSIS — N39.0 URINARY TRACT INFECTION, SITE NOT SPECIFIED: ICD-10-CM

## 2021-10-23 LAB
ALBUMIN SERPL ELPH-MCNC: 4 G/DL — SIGNIFICANT CHANGE UP (ref 3.3–5)
ALP SERPL-CCNC: 197 U/L — HIGH (ref 40–120)
ALT FLD-CCNC: 34 U/L — SIGNIFICANT CHANGE UP (ref 10–45)
ANION GAP SERPL CALC-SCNC: 15 MMOL/L — SIGNIFICANT CHANGE UP (ref 5–17)
APPEARANCE UR: CLEAR — SIGNIFICANT CHANGE UP
APTT BLD: 27.8 SEC — SIGNIFICANT CHANGE UP (ref 27.5–35.5)
AST SERPL-CCNC: 25 U/L — SIGNIFICANT CHANGE UP (ref 10–40)
BACTERIA # UR AUTO: NEGATIVE — SIGNIFICANT CHANGE UP
BASE EXCESS BLDV CALC-SCNC: 3 MMOL/L — HIGH (ref -2–2)
BASOPHILS # BLD AUTO: 0 K/UL — SIGNIFICANT CHANGE UP (ref 0–0.2)
BASOPHILS NFR BLD AUTO: 0 % — SIGNIFICANT CHANGE UP (ref 0–2)
BILIRUB SERPL-MCNC: 1 MG/DL — SIGNIFICANT CHANGE UP (ref 0.2–1.2)
BILIRUB UR-MCNC: NEGATIVE — SIGNIFICANT CHANGE UP
BUN SERPL-MCNC: 8 MG/DL — SIGNIFICANT CHANGE UP (ref 7–23)
CA-I SERPL-SCNC: 1.16 MMOL/L — SIGNIFICANT CHANGE UP (ref 1.15–1.33)
CALCIUM SERPL-MCNC: 9.4 MG/DL — SIGNIFICANT CHANGE UP (ref 8.4–10.5)
CHLORIDE BLDV-SCNC: 99 MMOL/L — SIGNIFICANT CHANGE UP (ref 96–108)
CHLORIDE SERPL-SCNC: 95 MMOL/L — LOW (ref 96–108)
CO2 BLDV-SCNC: 29 MMOL/L — HIGH (ref 22–26)
CO2 SERPL-SCNC: 22 MMOL/L — SIGNIFICANT CHANGE UP (ref 22–31)
COLOR SPEC: YELLOW — SIGNIFICANT CHANGE UP
CREAT SERPL-MCNC: 0.56 MG/DL — SIGNIFICANT CHANGE UP (ref 0.5–1.3)
DIFF PNL FLD: ABNORMAL
EOSINOPHIL # BLD AUTO: 0 K/UL — SIGNIFICANT CHANGE UP (ref 0–0.5)
EOSINOPHIL NFR BLD AUTO: 0 % — SIGNIFICANT CHANGE UP (ref 0–6)
EPI CELLS # UR: 16 /HPF — HIGH
GAS PNL BLDV: 132 MMOL/L — LOW (ref 136–145)
GAS PNL BLDV: SIGNIFICANT CHANGE UP
GAS PNL BLDV: SIGNIFICANT CHANGE UP
GLUCOSE BLDV-MCNC: 119 MG/DL — HIGH (ref 70–99)
GLUCOSE SERPL-MCNC: 111 MG/DL — HIGH (ref 70–99)
GLUCOSE UR QL: NEGATIVE — SIGNIFICANT CHANGE UP
HCO3 BLDV-SCNC: 28 MMOL/L — SIGNIFICANT CHANGE UP (ref 22–29)
HCT VFR BLD CALC: 40.1 % — SIGNIFICANT CHANGE UP (ref 34.5–45)
HCT VFR BLDA CALC: 38 % — SIGNIFICANT CHANGE UP (ref 34.5–46.5)
HGB BLD CALC-MCNC: 12.6 G/DL — SIGNIFICANT CHANGE UP (ref 11.7–16.1)
HGB BLD-MCNC: 12.8 G/DL — SIGNIFICANT CHANGE UP (ref 11.5–15.5)
HYALINE CASTS # UR AUTO: 14 /LPF — HIGH (ref 0–2)
INR BLD: 1.27 RATIO — HIGH (ref 0.88–1.16)
KETONES UR-MCNC: ABNORMAL
LACTATE BLDV-MCNC: 1.2 MMOL/L — SIGNIFICANT CHANGE UP (ref 0.7–2)
LEUKOCYTE ESTERASE UR-ACNC: NEGATIVE — SIGNIFICANT CHANGE UP
LYMPHOCYTES # BLD AUTO: 0.15 K/UL — LOW (ref 1–3.3)
LYMPHOCYTES # BLD AUTO: 0.9 % — LOW (ref 13–44)
MANUAL SMEAR VERIFICATION: SIGNIFICANT CHANGE UP
MCHC RBC-ENTMCNC: 28.9 PG — SIGNIFICANT CHANGE UP (ref 27–34)
MCHC RBC-ENTMCNC: 31.9 GM/DL — LOW (ref 32–36)
MCV RBC AUTO: 90.5 FL — SIGNIFICANT CHANGE UP (ref 80–100)
MONOCYTES # BLD AUTO: 0.86 K/UL — SIGNIFICANT CHANGE UP (ref 0–0.9)
MONOCYTES NFR BLD AUTO: 5.2 % — SIGNIFICANT CHANGE UP (ref 2–14)
NEUTROPHILS # BLD AUTO: 15.57 K/UL — HIGH (ref 1.8–7.4)
NEUTROPHILS NFR BLD AUTO: 93.9 % — HIGH (ref 43–77)
NITRITE UR-MCNC: NEGATIVE — SIGNIFICANT CHANGE UP
PCO2 BLDV: 43 MMHG — HIGH (ref 39–42)
PH BLDV: 7.42 — SIGNIFICANT CHANGE UP (ref 7.32–7.43)
PH UR: 7 — SIGNIFICANT CHANGE UP (ref 5–8)
PLAT MORPH BLD: NORMAL — SIGNIFICANT CHANGE UP
PLATELET # BLD AUTO: 280 K/UL — SIGNIFICANT CHANGE UP (ref 150–400)
PO2 BLDV: 25 MMHG — SIGNIFICANT CHANGE UP (ref 25–45)
POTASSIUM BLDV-SCNC: 4.1 MMOL/L — SIGNIFICANT CHANGE UP (ref 3.5–5.1)
POTASSIUM SERPL-MCNC: 3.8 MMOL/L — SIGNIFICANT CHANGE UP (ref 3.5–5.3)
POTASSIUM SERPL-SCNC: 3.8 MMOL/L — SIGNIFICANT CHANGE UP (ref 3.5–5.3)
PROT SERPL-MCNC: 8.3 G/DL — SIGNIFICANT CHANGE UP (ref 6–8.3)
PROT UR-MCNC: ABNORMAL
PROTHROM AB SERPL-ACNC: 15.1 SEC — HIGH (ref 10.6–13.6)
RAPID RVP RESULT: SIGNIFICANT CHANGE UP
RBC # BLD: 4.43 M/UL — SIGNIFICANT CHANGE UP (ref 3.8–5.2)
RBC # FLD: 13.2 % — SIGNIFICANT CHANGE UP (ref 10.3–14.5)
RBC BLD AUTO: NORMAL — SIGNIFICANT CHANGE UP
RBC CASTS # UR COMP ASSIST: 10 /HPF — HIGH (ref 0–4)
SAO2 % BLDV: 40.5 % — LOW (ref 67–88)
SARS-COV-2 RNA SPEC QL NAA+PROBE: SIGNIFICANT CHANGE UP
SARS-COV-2 RNA SPEC QL NAA+PROBE: SIGNIFICANT CHANGE UP
SODIUM SERPL-SCNC: 132 MMOL/L — LOW (ref 135–145)
SP GR SPEC: >1.05 (ref 1.01–1.02)
TROPONIN T, HIGH SENSITIVITY RESULT: <6 NG/L — SIGNIFICANT CHANGE UP (ref 0–51)
UROBILINOGEN FLD QL: NEGATIVE — SIGNIFICANT CHANGE UP
WBC # BLD: 16.58 K/UL — HIGH (ref 3.8–10.5)
WBC # FLD AUTO: 16.58 K/UL — HIGH (ref 3.8–10.5)
WBC UR QL: 1 /HPF — SIGNIFICANT CHANGE UP (ref 0–5)

## 2021-10-23 PROCEDURE — 70450 CT HEAD/BRAIN W/O DYE: CPT | Mod: 26,MG

## 2021-10-23 PROCEDURE — G1004: CPT

## 2021-10-23 PROCEDURE — 70486 CT MAXILLOFACIAL W/O DYE: CPT | Mod: 26,MB

## 2021-10-23 PROCEDURE — 72125 CT NECK SPINE W/O DYE: CPT | Mod: 26,MG

## 2021-10-23 PROCEDURE — 76377 3D RENDER W/INTRP POSTPROCES: CPT | Mod: 26

## 2021-10-23 PROCEDURE — 99285 EMERGENCY DEPT VISIT HI MDM: CPT

## 2021-10-23 PROCEDURE — 93010 ELECTROCARDIOGRAM REPORT: CPT

## 2021-10-23 PROCEDURE — 71260 CT THORAX DX C+: CPT | Mod: 26,MB

## 2021-10-23 PROCEDURE — 74177 CT ABD & PELVIS W/CONTRAST: CPT | Mod: 26,MB

## 2021-10-23 RX ORDER — ACETAMINOPHEN 500 MG
975 TABLET ORAL ONCE
Refills: 0 | Status: COMPLETED | OUTPATIENT
Start: 2021-10-23 | End: 2021-10-23

## 2021-10-23 RX ORDER — SODIUM CHLORIDE 9 MG/ML
1000 INJECTION, SOLUTION INTRAVENOUS ONCE
Refills: 0 | Status: COMPLETED | OUTPATIENT
Start: 2021-10-23 | End: 2021-10-23

## 2021-10-23 RX ORDER — ASPIRIN/CALCIUM CARB/MAGNESIUM 324 MG
81 TABLET ORAL DAILY
Refills: 0 | Status: DISCONTINUED | OUTPATIENT
Start: 2021-10-23 | End: 2021-10-25

## 2021-10-23 RX ORDER — HEPARIN SODIUM 5000 [USP'U]/ML
5000 INJECTION INTRAVENOUS; SUBCUTANEOUS EVERY 8 HOURS
Refills: 0 | Status: DISCONTINUED | OUTPATIENT
Start: 2021-10-23 | End: 2021-10-25

## 2021-10-23 RX ORDER — METOPROLOL TARTRATE 50 MG
50 TABLET ORAL DAILY
Refills: 0 | Status: DISCONTINUED | OUTPATIENT
Start: 2021-10-23 | End: 2021-10-25

## 2021-10-23 RX ORDER — DONEPEZIL HYDROCHLORIDE 10 MG/1
5 TABLET, FILM COATED ORAL AT BEDTIME
Refills: 0 | Status: DISCONTINUED | OUTPATIENT
Start: 2021-10-23 | End: 2021-10-25

## 2021-10-23 RX ORDER — AZITHROMYCIN 500 MG/1
500 TABLET, FILM COATED ORAL EVERY 24 HOURS
Refills: 0 | Status: DISCONTINUED | OUTPATIENT
Start: 2021-10-24 | End: 2021-10-25

## 2021-10-23 RX ORDER — AZITHROMYCIN 500 MG/1
TABLET, FILM COATED ORAL
Refills: 0 | Status: DISCONTINUED | OUTPATIENT
Start: 2021-10-23 | End: 2021-10-25

## 2021-10-23 RX ORDER — IPRATROPIUM/ALBUTEROL SULFATE 18-103MCG
3 AEROSOL WITH ADAPTER (GRAM) INHALATION EVERY 6 HOURS
Refills: 0 | Status: DISCONTINUED | OUTPATIENT
Start: 2021-10-23 | End: 2021-10-25

## 2021-10-23 RX ORDER — ATORVASTATIN CALCIUM 80 MG/1
10 TABLET, FILM COATED ORAL AT BEDTIME
Refills: 0 | Status: DISCONTINUED | OUTPATIENT
Start: 2021-10-23 | End: 2021-10-25

## 2021-10-23 RX ORDER — CEFTRIAXONE 500 MG/1
1000 INJECTION, POWDER, FOR SOLUTION INTRAMUSCULAR; INTRAVENOUS ONCE
Refills: 0 | Status: COMPLETED | OUTPATIENT
Start: 2021-10-23 | End: 2021-10-23

## 2021-10-23 RX ORDER — AZITHROMYCIN 500 MG/1
500 TABLET, FILM COATED ORAL ONCE
Refills: 0 | Status: COMPLETED | OUTPATIENT
Start: 2021-10-23 | End: 2021-10-23

## 2021-10-23 RX ORDER — CEFTRIAXONE 500 MG/1
1000 INJECTION, POWDER, FOR SOLUTION INTRAMUSCULAR; INTRAVENOUS EVERY 24 HOURS
Refills: 0 | Status: DISCONTINUED | OUTPATIENT
Start: 2021-10-23 | End: 2021-10-25

## 2021-10-23 RX ADMIN — DONEPEZIL HYDROCHLORIDE 5 MILLIGRAM(S): 10 TABLET, FILM COATED ORAL at 23:43

## 2021-10-23 RX ADMIN — CEFTRIAXONE 100 MILLIGRAM(S): 500 INJECTION, POWDER, FOR SOLUTION INTRAMUSCULAR; INTRAVENOUS at 19:05

## 2021-10-23 RX ADMIN — Medication 3 MILLILITER(S): at 23:43

## 2021-10-23 RX ADMIN — Medication 975 MILLIGRAM(S): at 19:30

## 2021-10-23 RX ADMIN — AZITHROMYCIN 250 MILLIGRAM(S): 500 TABLET, FILM COATED ORAL at 20:32

## 2021-10-23 RX ADMIN — SODIUM CHLORIDE 1000 MILLILITER(S): 9 INJECTION, SOLUTION INTRAVENOUS at 19:31

## 2021-10-23 RX ADMIN — ATORVASTATIN CALCIUM 10 MILLIGRAM(S): 80 TABLET, FILM COATED ORAL at 23:42

## 2021-10-23 RX ADMIN — HEPARIN SODIUM 5000 UNIT(S): 5000 INJECTION INTRAVENOUS; SUBCUTANEOUS at 23:43

## 2021-10-23 RX ADMIN — Medication 975 MILLIGRAM(S): at 19:27

## 2021-10-23 NOTE — ED ADULT NURSE NOTE - CHIEF COMPLAINT QUOTE
focal seizure since August  Fall x 2 days ago nasal injury ,ON Coumadin  Rt sided rib pain cough worsening Denies Fever SOB Chronic cough

## 2021-10-23 NOTE — ED PROVIDER NOTE - PHYSICAL EXAMINATION
CONSTITUTIONAL: NAD  SKIN: 1cm healed abrasion over bridge of nose, no tenderness to palpation  HEAD: NCAT  EYES: EOMI, PERRLA, no scleral icterus, conjunctiva pink  ENT: normal pharynx with no erythema or exudates  NECK: Supple; non tender. Full ROM. no C spine tenderness  CARD: RRR, systolic murmur  RESP: clear to ausculation b/l. No crackles or wheezing. No chest wall tenderness  ABD: soft, non-tender, non-distended, no rebound or guarding,   MSK: No pedal edema, no calf tenderness  PSYCH: Cooperative, forgetful but A/Ox2

## 2021-10-23 NOTE — ED ADULT NURSE NOTE - OBJECTIVE STATEMENT
Patient  is  alert  and  oriented x3.  Color  is  good  and  skin warm  to  touch.   She  had  an  unwitnessed  fall  2  days  ago.  Sh e is  c/o  SOB  and  right  rib pain with coughing.  Abrasion noted  to  her  nose  bridge.  She  denies  headache, nausea, vomiting  or  visual  changes.

## 2021-10-23 NOTE — ED PROVIDER NOTE - PROGRESS NOTE DETAILS
Derik Shirley, DO PGY-2: Pt became febrile, tachycardic while in ED, treating with ceftriaxone for presumed UTI, will admit for IV antibiotics

## 2021-10-23 NOTE — ED ADULT TRIAGE NOTE - CHIEF COMPLAINT QUOTE
focal seizure since August Fall x 2 days ago nasal inj ON Coumadin  Rt sided rib pain cough worsening yesterday after returning from [place] focal seizure since August Fall x 2 days ago nasal inj ON Coumadin  Rt sided rib pain cough worsening Denies Fever SOB Chronic cough

## 2021-10-23 NOTE — ED PROVIDER NOTE - NS ED ROS FT
Constitutional:  (-) fever, (-) chills, (-) lethargy  Eyes:  (-) eye pain (-) visual changes  ENMT: (-) nasal discharge, (-) sore throat. (-) neck pain or stiffness  Cardiac: (-) chest pain (-) palpitations  Respiratory:  (+) cough (-) respiratory distress.   GI:  (-) nausea (-) vomiting (-) diarrhea (-) abdominal pain.  :  (-) dysuria (-) frequency (-) burning.  MS:  (-) back pain (-) joint pain.  Neuro:  (-) headache (-) numbness (-) tingling (-) focal weakness  Skin:  (-) rash  Except as documented in the HPI,  all other systems are negative

## 2021-10-23 NOTE — H&P ADULT - ASSESSMENT
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 , HTN, on coumadin here for evaluation for fall 2 days ago, unwitnessed presumed fall, pt has abrasion on bridge of nose, as per son @ bedside pt was not found down, acting normal, went to  today for evaluation of nose and was told to come to ED. Pt does not remember fall but this is baseline as per son. As per daughter on phone, unsure of why pt is on coumadin. Pt endorses no symptoms today, states she feels well. Son states pt has been having chronic cough x months but recently ahs had some R sided chest pain while coughing.    full note to follow 75F pmh focal seizures R upper and R lower extremity seizures, dementia,  CVA? , HTN, on coumadin here for evaluation for fall 2 days ago, unwitnessed presumed fall, pt has abrasion on bridge of nose, as per son @ bedside pt was not found down, acting normal, went to  today for evaluation of nose and was told to come to ED. Pt does not remember fall but this is baseline as per son. As per daughter on phone, unsure of why pt is on coumadin. Pt endorses no symptoms today, states she feels well. Son states pt has been having chronic cough x months but recently ahs had some R sided chest pain while coughing.    fever  - c/w iv abx  - no clear source  - follow up cultures    seizure  - follow up with neurologist    dementia  - c/w aricept    HLD  - c/w pravastatin

## 2021-10-23 NOTE — H&P ADULT - NSHPLABSRESULTS_GEN_ALL_CORE
LABS:                        12.8   16.58 )-----------( 280      ( 23 Oct 2021 14:23 )             40.1     10-23    132<L>  |  95<L>  |  8   ----------------------------<  111<H>  3.8   |  22  |  0.56    Ca    9.4      23 Oct 2021 14:23    TPro  8.3  /  Alb  4.0  /  TBili  1.0  /  DBili  x   /  AST  25  /  ALT  34  /  AlkPhos  197<H>  10-23      Urinalysis Basic - ( 23 Oct 2021 18:49 )    Color: Yellow / Appearance: Clear / SG: >1.050 / pH: x  Gluc: x / Ketone: Moderate  / Bili: Negative / Urobili: Negative   Blood: x / Protein: 30 mg/dL / Nitrite: Negative   Leuk Esterase: Negative / RBC: 10 /hpf / WBC 1 /HPF   Sq Epi: x / Non Sq Epi: 16 /hpf / Bacteria: Negative            RADIOLOGY & ADDITIONAL TESTS:

## 2021-10-23 NOTE — ED PROVIDER NOTE - ATTENDING CONTRIBUTION TO CARE
Patient is a 76 yo F with history of dementia, new diagnosis of focal seizures, HTN, CVA on coumadin here for evaluation for fall. Son is at bedside providing history. Accordingly, patient had an unwitnessed fall 2 days ago. He thinks she may have had a seizure. Patient was able to facetime her grandchildren afterwards. She cannot say how she fell or if she had any preceding symptoms. She was taken to urgent care today because she has had right sided rib pain since the fall and assocated cough. She was advised to come in for evaluation.     VS noted  Gen. no acute distress, Non toxic   HEENT: PERRL, EOMI, mmm, abrasion to nasal bridge  Spine: No C-T-L spine tenderness  Lungs: CTAB/L no C/ W /R   CVS: RRR   Abd; Soft non tender, non distended   Ext: no edema  Skin: no rash  Neuro: awake, alert, CN 2-12 intact, strength is 5/5 in UE/LE, non focal clear speech  a/p: s/p fall on coumadin - pt unable to say how she fell. Per son, she is not on antiepileptic medications and has an appointment on Tuesday with neurologist. Plan for CT Head, CT C-spine, CT Chest and CT A/P, labs, ekg, u/a and reassess.   - Onel RODGERS

## 2021-10-23 NOTE — ED ADULT NURSE NOTE - NSIMPLEMENTINTERV_GEN_ALL_ED
Implemented All Fall with Harm Risk Interventions:  [place] to call system. Call bell, personal items and telephone within reach. Instruct patient to call for assistance. Room bathroom lighting operational. Non-slip footwear when patient is off stretcher. Physically safe environment: no spills, clutter or unnecessary equipment. Stretcher in lowest position, wheels locked, appropriate side rails in place. Provide visual cue, wrist band, yellow gown, etc. Monitor gait and stability. Monitor for mental status changes and reorient to person, place, and time. Review medications for side effects contributing to fall risk. Reinforce activity limits and safety measures with patient and family. Provide visual clues: red socks.

## 2021-10-23 NOTE — ED PROVIDER NOTE - OBJECTIVE STATEMENT
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 , HTN, on coumadin here for evaluation for fall 2 days ago, unwitnessed presumed fall, pt has abrasion on bridge of nose, as per son @ bedside pt was not found down, acting normal, went to  today for evaluation of nose and was told to come to ED. Pt does not remember fall but this is baseline as per son. As per daughter on phone, unsure of why pt is on coumadin. Pt endorses no symptoms today, states she feels well. Son states pt has been having chronic cough x months but recently ahs had some R sided chest pain while coughing.

## 2021-10-23 NOTE — ED PROVIDER NOTE - CLINICAL SUMMARY MEDICAL DECISION MAKING FREE TEXT BOX
PMH Focal seizures, HTN, CVA on coumadin here for evaluation for unwitnessed fall 2 days ago on coumadin, r/o intracranial bleed. pt also has R sided chest wall tenderness, CT head neck abd pelvis, check labs, if all OK can DC with outpatient followup, r/o infectious cause of weakness/fall.

## 2021-10-24 LAB
ANION GAP SERPL CALC-SCNC: 14 MMOL/L — SIGNIFICANT CHANGE UP (ref 5–17)
BUN SERPL-MCNC: 6 MG/DL — LOW (ref 7–23)
CALCIUM SERPL-MCNC: 8.6 MG/DL — SIGNIFICANT CHANGE UP (ref 8.4–10.5)
CHLORIDE SERPL-SCNC: 97 MMOL/L — SIGNIFICANT CHANGE UP (ref 96–108)
CO2 SERPL-SCNC: 24 MMOL/L — SIGNIFICANT CHANGE UP (ref 22–31)
COVID-19 SPIKE DOMAIN AB INTERP: POSITIVE
COVID-19 SPIKE DOMAIN ANTIBODY RESULT: >250 U/ML — HIGH
CREAT SERPL-MCNC: 0.49 MG/DL — LOW (ref 0.5–1.3)
FOLATE SERPL-MCNC: 13.1 NG/ML — SIGNIFICANT CHANGE UP
GLUCOSE SERPL-MCNC: 125 MG/DL — HIGH (ref 70–99)
HCT VFR BLD CALC: 37.6 % — SIGNIFICANT CHANGE UP (ref 34.5–45)
HGB BLD-MCNC: 12.4 G/DL — SIGNIFICANT CHANGE UP (ref 11.5–15.5)
MAGNESIUM SERPL-MCNC: 1.9 MG/DL — SIGNIFICANT CHANGE UP (ref 1.6–2.6)
MCHC RBC-ENTMCNC: 29.8 PG — SIGNIFICANT CHANGE UP (ref 27–34)
MCHC RBC-ENTMCNC: 33 GM/DL — SIGNIFICANT CHANGE UP (ref 32–36)
MCV RBC AUTO: 90.4 FL — SIGNIFICANT CHANGE UP (ref 80–100)
NRBC # BLD: 0 /100 WBCS — SIGNIFICANT CHANGE UP (ref 0–0)
PHOSPHATE SERPL-MCNC: 2 MG/DL — LOW (ref 2.5–4.5)
PLATELET # BLD AUTO: 255 K/UL — SIGNIFICANT CHANGE UP (ref 150–400)
POTASSIUM SERPL-MCNC: 3.5 MMOL/L — SIGNIFICANT CHANGE UP (ref 3.5–5.3)
POTASSIUM SERPL-SCNC: 3.5 MMOL/L — SIGNIFICANT CHANGE UP (ref 3.5–5.3)
RBC # BLD: 4.16 M/UL — SIGNIFICANT CHANGE UP (ref 3.8–5.2)
RBC # FLD: 13.1 % — SIGNIFICANT CHANGE UP (ref 10.3–14.5)
SARS-COV-2 IGG+IGM SERPL QL IA: >250 U/ML — HIGH
SARS-COV-2 IGG+IGM SERPL QL IA: POSITIVE
SODIUM SERPL-SCNC: 135 MMOL/L — SIGNIFICANT CHANGE UP (ref 135–145)
TSH SERPL-MCNC: 1.18 UIU/ML — SIGNIFICANT CHANGE UP (ref 0.27–4.2)
VIT B12 SERPL-MCNC: 845 PG/ML — SIGNIFICANT CHANGE UP (ref 232–1245)
WBC # BLD: 15.23 K/UL — HIGH (ref 3.8–10.5)
WBC # FLD AUTO: 15.23 K/UL — HIGH (ref 3.8–10.5)

## 2021-10-24 RX ORDER — INFLUENZA VIRUS VACCINE 15; 15; 15; 15 UG/.5ML; UG/.5ML; UG/.5ML; UG/.5ML
0.5 SUSPENSION INTRAMUSCULAR ONCE
Refills: 0 | Status: DISCONTINUED | OUTPATIENT
Start: 2021-10-24 | End: 2021-10-25

## 2021-10-24 RX ORDER — POTASSIUM PHOSPHATE, MONOBASIC POTASSIUM PHOSPHATE, DIBASIC 236; 224 MG/ML; MG/ML
30 INJECTION, SOLUTION INTRAVENOUS ONCE
Refills: 0 | Status: COMPLETED | OUTPATIENT
Start: 2021-10-24 | End: 2021-10-24

## 2021-10-24 RX ADMIN — Medication 3 MILLILITER(S): at 12:51

## 2021-10-24 RX ADMIN — HEPARIN SODIUM 5000 UNIT(S): 5000 INJECTION INTRAVENOUS; SUBCUTANEOUS at 05:12

## 2021-10-24 RX ADMIN — Medication 1200 MILLIGRAM(S): at 17:13

## 2021-10-24 RX ADMIN — POTASSIUM PHOSPHATE, MONOBASIC POTASSIUM PHOSPHATE, DIBASIC 83.33 MILLIMOLE(S): 236; 224 INJECTION, SOLUTION INTRAVENOUS at 13:46

## 2021-10-24 RX ADMIN — Medication 3 MILLILITER(S): at 17:11

## 2021-10-24 RX ADMIN — Medication 50 MILLIGRAM(S): at 05:12

## 2021-10-24 RX ADMIN — Medication 3 MILLILITER(S): at 05:11

## 2021-10-24 RX ADMIN — Medication 81 MILLIGRAM(S): at 12:51

## 2021-10-24 RX ADMIN — HEPARIN SODIUM 5000 UNIT(S): 5000 INJECTION INTRAVENOUS; SUBCUTANEOUS at 13:47

## 2021-10-24 RX ADMIN — DONEPEZIL HYDROCHLORIDE 5 MILLIGRAM(S): 10 TABLET, FILM COATED ORAL at 21:19

## 2021-10-24 RX ADMIN — CEFTRIAXONE 100 MILLIGRAM(S): 500 INJECTION, POWDER, FOR SOLUTION INTRAMUSCULAR; INTRAVENOUS at 17:15

## 2021-10-24 RX ADMIN — AZITHROMYCIN 250 MILLIGRAM(S): 500 TABLET, FILM COATED ORAL at 21:20

## 2021-10-24 RX ADMIN — ATORVASTATIN CALCIUM 10 MILLIGRAM(S): 80 TABLET, FILM COATED ORAL at 21:21

## 2021-10-24 RX ADMIN — HEPARIN SODIUM 5000 UNIT(S): 5000 INJECTION INTRAVENOUS; SUBCUTANEOUS at 21:19

## 2021-10-24 NOTE — PHYSICAL THERAPY INITIAL EVALUATION ADULT - PLANNED THERAPY INTERVENTIONS, PT EVAL
Pt is independent w/ all functional mobility. Does not require skilled PT interventions at this time. PT to sign off and remain available for re-consult as required.

## 2021-10-24 NOTE — CONSULT NOTE ADULT - ASSESSMENT
ASSESSMENT:    admitted following a seizure resulting in a fracture and laceration of the left nasal bone - seizures are of recent onset and she remains off anticonvulsants awaiting an outpatient EEG and MRI    was awaiting discharge but developed a fever in the ER - she has a leukocytosis and a chronic cough related to severe bronchiectasis - CT scan -> . severe bronchiectasis with peribronchial vascular soft tissue thickening and scattered tree-in-bud opacities likely due to impacted small airways - there are multiple nodular opacities predominantly in the lower lobes that may represent a superimposed pneumonia rather than chronic changes    PLAN/RECOMMENDATIONS:    stable oxygenation on room air  following fever curve and WBC count  ceftriaxone/doxycycline  await blood cultures, sputum cultures and urine Legionella antigen  albuterol/atrovent nebs q6h  mucinex 1200mg 2 times daily  acapella device  cardiac meds: ASA/toprol XL/lipitor  DVT prophylaxis  perhaps the neurology evaluation can be done in the inpatient setting so anticonvulsants can be initiated    Thank you for the courtesy of this referral. Plan of care discussed with the patient and her  at bedside and with her children by phone and with Dr. Rashaun Velazquez MD, [facility]  [phone]  Pulmonary Medicine           ASSESSMENT:    admitted following a seizure resulting in a fracture of the left nasal bone and a laceration on the bridge of her nose - no other significant injuries have been found on "pan" CT scans - seizures are of recent onset and she remains off anticonvulsants awaiting an outpatient EEG and MRI    was awaiting discharge from the ER but was found to have a fever -> 101.1F  - she has a leukocytosis and a chronic cough related to severe bronchiectasis - CT scan -> severe bronchiectasis with peribronchial vascular soft tissue thickening and scattered tree-in-bud opacities likely due to impacted small airways - there are multiple nodular opacities predominantly in the lower lobes - it is impossible to "rule out" a superimposed pneumonia on the chronically abnormal chest CT    PLAN/RECOMMENDATIONS:    stable oxygenation on room air  following fever curve and WBC count  ceftriaxone/doxycycline  await blood cultures, sputum cultures and urine Legionella antigen - would obtain sputum for AFB - no isolation needed -> looking for atypical mycobacterium infection  albuterol/atrovent nebs q6h  mucinex 1200mg 2 times daily  acapella device  cardiac meds: ASA/toprol XL/lipitor  DVT prophylaxis  perhaps the neurology evaluation can be done in the inpatient setting so anticonvulsants can be initiated    Thank you for the courtesy of this referral. Plan of care discussed with the patient and her  at bedside and with her children by phone and with Dr. Rashaun Velazquez MD, [facility]  [phone]  Pulmonary Medicine

## 2021-10-24 NOTE — CONSULT NOTE ADULT - SUBJECTIVE AND OBJECTIVE BOX
[facility] - [facility] - CONSULT NOTE    HPI: 75 year old gentlewoman, former smoker, with history of bronchiectasis. The patient also has a history of dementia, focal seizures usually involving the right sided limbs, HTN, HLD and (?) CVA. She is on coumadin. The patient was admitted yesterday 2 days after an unwitnessed fall resulting in an abrasion to her nose. The patient has no recollection of the fall. The patient was found to be febrile in the ER. She has had a persistent cough over the last several months    PMHX:  Bronchiectasis  HTN (hypertension)  HLD (hyperlipidemia)  Focal seizure  Dementia    PSHX:      FAMILY HISTORY:      SOCIAL HISTORY:  ; lives with     Pulmonary Medications:   albuterol/ipratropium for Nebulization 3 milliLiter(s) Nebulizer every 6 hours      Antimicrobials:  azithromycin  IVPB      azithromycin  IVPB 500 milliGRAM(s) IV Intermittent every 24 hours  cefTRIAXone   IVPB 1000 milliGRAM(s) IV Intermittent every 24 hours      Cardiology:  metoprolol succinate ER 50 milliGRAM(s) Oral daily      Other:  aspirin  chewable 81 milliGRAM(s) Oral daily  atorvastatin 10 milliGRAM(s) Oral at bedtime  donepezil 5 milliGRAM(s) Oral at bedtime  heparin   Injectable 5000 Unit(s) SubCutaneous every 8 hours      Prn:  MEDICATIONS  (PRN):      Allergies    No Known Allergies    HOME MEDICATIONS: see  H & P    REVIEW OF SYSTEMS:  Constitutional: As per HPI  HEENT: Within normal limits  CV: As per HPI  Resp: As per HPI  GI: Within normal limits   : Within normal limits  Musculoskeletal: Within normal limits  Skin: Within normal limits  Neurological: Within normal limits  Psychiatric: Within normal limits  Endocrine: Within normal limits  Hematologic/Lymphatic: Within normal limits  Allergic/Immunologic: Within normal limits    [x] All other systems negative    OBJECTIVE:    PHYSICAL EXAM:  ICU Vital Signs Last 24 Hrs  T(C): 37.1 (24 Oct 2021 11:39), Max: 38.4 (23 Oct 2021 18:11)  T(F): 98.8 (24 Oct 2021 11:39), Max: 101.1 (23 Oct 2021 18:11)  HR: 89 (24 Oct 2021 11:39) (69 - 106)  BP: 144/85 (24 Oct 2021 11:39) (103/87 - 169/92)  BP(mean): 70 (23 Oct 2021 22:32) (70 - 102)  ABP: --  ABP(mean): --  RR: 20 (24 Oct 2021 11:39) (17 - 20)  SpO2: 96% (24 Oct 2021 11:39) (90% - 98%)    General: Awake. Alert. Cooperative. No distress. Appears stated age 	  HEENT:   Atraumatic. Normocephalic. Anicteric. Normal oral mucosa. PERRL. EOMI.  Neck: Supple. Trachea midline. Thyroid without enlargement/tenderness/nodules. No carotid bruit. No JVD.	  Cardiovascular: Regular rate and rhythm. S1 S2 normal. No murmurs, rubs or gallops.  Respiratory: Respirations unlabored. Clear to auscultation and percussion bilaterally. No curvature.  Abdomen: Soft. Non-tender. Non-distended. No organomegaly. No masses. Normal bowel sounds.  Extremities: Warm to touch. No clubbing or cyanosis. No pedal edema.  Pulses: 2+ peripheral pulses all extremities.	  Skin: Normal skin color. No rashes or lesions. No ecchymoses. No cyanosis. Warm to touch.  Lymph Nodes: Cervical, supraclavicular and axillary nodes normal  Neurological: Motor and sensory examination equal and normal. A and O x 3  Psychiatry: Appropriate mood and affect.      LABS:                          12.4   15.23 )-----------( 255      ( 24 Oct 2021 05:52 )             37.6     CBC    WBC  15.23 <==, 16.58 <==    Hemoglobin  12.4 <<==, 12.8 <<==    Hematocrit  37.6 <==, 40.1 <==    Platelets  255 <==, 280 <==      135  |  97  |  6<L>  ----------------------------<  125<H>    10-24  3.5   |  24  |  0.49<L>    LYTES    sodium  135 <==, 132 <==    potassium   3.5 <==, 3.8 <==    chloride  97 <==, 95 <==    carbon dioxide  24 <==, 22 <==    =============================================================================================  RENAL FUNCTION:    Creatinine:   0.49  <<==, 0.56  <<==    BUN:   6 <==, 8 <==    ============================================================================================    calcium   8.6 <==, 9.4 <==    phos   2.0 <==    mag   1.9 <==    ============================================================================================  LFTs    AST:   25 <==     ALT:  34  <==     AP:  197  <=    Bili:  1.0  <=    PT/INR - ( 23 Oct 2021 19:41 )   PT: 15.1 sec;   INR: 1.27 ratio       PTT - ( 23 Oct 2021 19:41 )  PTT:27.8 sec    Venous Blood Gas:  10-23 @ 19:22  7.42/43/25/28/40.5  VBG Lactate: 1.2    CARDIAC MARKERS ( 23 Oct 2021 14:23 )  CPK x     /CKMB x     /CKMB Units x        troponin <6 ng/L    MICROBIOLOGY:     Respiratory Viral Panel with COVID-19 by ORQUIDEA (10.23.21 @ 19:40)   Rapid RVP Result: NotDetec   SARS-CoV-2: NotDetec:       Urinalysis Basic - ( 23 Oct 2021 18:49 )    Color: Yellow / Appearance: Clear / SG: >1.050 / pH: x  Gluc: x / Ketone: Moderate  / Bili: Negative / Urobili: Negative   Blood: x / Protein: 30 mg/dL / Nitrite: Negative   Leuk Esterase: Negative / RBC: 10 /hpf / WBC 1 /HPF   Sq Epi: x / Non Sq Epi: 16 /hpf / Bacteria: Negative    RADIOLOGY:  [x ] Chest radiographs reviewed and interpreted by me    < from: CT Chest w/ IV Cont (10.23.21 @ 16:48) >    EXAM:  CT CHEST IC                          EXAM:  CT ABDOMEN AND PELVIS IC                          PROCEDURE DATE:  10/23/2021      INTERPRETATION:  CLINICAL INFORMATION: 75-year-old female with dementia and seizure disorder status post fall on Coumadin with facial trauma.    COMPARISON: None.    CONTRAST/COMPLICATIONS:  IV Contrast: Omnipaque 350  90 cc administered   10 cc discarded  Oral Contrast: None  Complications: None reported    PROCEDURE:  CT of the Chest, Abdomen and Pelviswas performed.  Imaging was performed through the chest in the arterial phase followed by imaging of the abdomen and pelvis in the portal venous phase.  Sagittal and coronal reformats were performed.    FINDINGS:  CHEST:  LUNGS AND LARGE AIRWAYS: Patent central airways. Severe bronchiectasis, particularly upper lobe, with peribronchial vascular soft tissue thickening and scattered tree-in-bud opacities, likely impacted small airways. Multiple nodular opacities, mainly lower lobes, likely chronic.  PLEURA: No pleural effusion.  VESSELS: Within normal limits.  HEART: Heart size is normal. No pericardial effusion.  MEDIASTINUM AND JARON: Enlarged mediastinal and hilar lymph nodes. Precarinal lymph node 2 x 1.6 cm. Left hilar lymph node calcification 2.6 x 1.8 cm. Subcarinal lymph node 2.2 x 1.2 cm.  CHEST WALL AND LOWER NECK: Within normal limits.    ABDOMEN AND PELVIS:  LIVER: Within normal limits.  BILE DUCTS: Normal caliber.  GALLBLADDER: Within normal limits.  SPLEEN: Within normal limits.  PANCREAS: Within normal limits.  ADRENALS: Within normal limits.  KIDNEYS/URETERS: Within normal limits.    BLADDER: Within normal limits.  REPRODUCTIVE ORGANS: Uterus and adnexa within normal limits.    BOWEL: No bowel obstruction. Appendix not visualized.  PERITONEUM: No ascites.  VESSELS: Within normal limits.  RETROPERITONEUM/LYMPH NODES: No lymphadenopathy.  ABDOMINAL WALL: Within normal limits.  BONES: Degenerative change.    IMPRESSION:  Severe bronchiectasis with peribronchial vascular soft tissue thickening, scattered nodular opacities, and impacted small airways, all likely chronic lung disease.  Mediastinal and left hilar lymphadenopathy.  No traumatic injury of the thorax or abdomen identified.    --- End of Report ---    LISSETTE MATUTE MD; Attending Radiologist  This document has been electronically signed. Oct 23 2021  5:28PM    < end of copied text >  ---------------------------------------------------------------------------------------------------------------    < from: CT Head No Cont (10.23.21 @ 16:37) >    EXAM:  CT MAXILLOFACIAL                          EXAM:  CT CERVICAL SPINE                          EXAM:  CT BRAIN                          EXAM:  CT 3D RECONSTRUCT W INGA                          PROCEDURE DATE:  10/23/2021      INTERPRETATION:  CT OF THE HEAD, MAXILLOFACIAL AND CERVICAL SPINE CT WITHOUT CONTRAST.    CLINICAL INDICATION: Status post fall, on Coumadin.    TECHNIQUE: Volumetric CT acquisition was performed through the brain and reviewed using brain and bone window technique. Axial noncontrast CT scans of maxillofacial region were performed. Sagittal and coronal reconstructions were obtained for both sets of images. Volumetric axial noncontrast images of the cervical spine were reconstructed in the axial, coronaland sagittal planes.  Dose optimization techniques were utilized including kVp/mA modulation along with iterative reconstructions.  3-D/MIPS images were obtained on a different workstation, reviewed and saved in PACS.      COMPARISON: No prior studies have been submitted for comparison.    FINDINGS:  Head CT:    There is prominence of the ventricles, sulci and cisterns of the brain which may be age related.  There are scattered white matter hypodensities which are nonspecific but most commonly represent chronic microvascular ischemic changes. There is no acute loss of gray-white differentiation.    There is no evidence of mass effect, midline shift, acute intracranial hemorrhage, or extra-axial collections.    There is no skull fracture. The visualized globes are symmetric bilaterally. The paranasal sinuses and tympanomastoid air cells are clear.      Maxillofacial CT:    The visualized globes are symmetric bilaterally and the lenses are normally situated. There is no preseptal edema or retrobulbar hematoma. The retro-ocular compartment including the intraconal/extraconal fat, extraocular muscles and optic nerve sheaths are intact.    There is minimally displaced acute left nasal bone fracture. The lamina papyracea are intact bilaterally as are the rest of the walls of the bony orbits.    The zygomatic arches, pterygoid plates and mandible are intact.      CT cervical spine:    There is maintenance of the usual cervical lordosis without fracture or traumatic malalignment. The vertebral body heights are maintained. The intervertebral disc spaces demonstrate loss of height at C5-C6.    The prevertebral soft tissues are normal.    The spinal canal is adequately patent without significant central canal narrowing.    Craniocervicaljunction C1-C2: Anatomic in alignment.    The spinous processes are in anatomic alignment.    There is uncovertebral joint spurring at C5-6 contributing to mild bilateral neural foraminal narrowing.    There are fibrotic changes in the lung apices with bronchiectasis.      IMPRESSION:    No evidence of skull fracture, intracranial hemorrhage or mass effect.    There is minimally displaced left nasal bone fracture.    No other facial bone fractures are seen.    There is no cervical spine fracture.    --- End of Report ---    AIDA FLORENCE MD; Attending Radiologist  This document has been electronically signed. Oct 23 2021  5:01PM    < end of copied text >  ---------------------------------------------------------------------------------------------------------------         [facility] - [facility] - CONSULT NOTE    HPI: 75 year old gentlewoman, former smoker, with history of bronchiectasis. This summer, the patient developed new onset focal seizures punctuated by several seconds of loss of consciousness (with eyes open staring into space) with subtle movement of the right sided limbs. She is awaiting an outpatient EEG and MRI. She also has a history of HTN, HLD and (?) CVA. The patient was sent to the ER from an urgent care center yesterday 2 days after a seizure resulting in an abrasion to her nose. The patient has no recollection of the "fall". The patient has been found to be febrile in the ER without chills or sweats. She has had a persistent cough for many years that is no worse than usual productive of scant sputum. She has no chest congestion or wheeze. She had right sided rib and hip pain that have resolved. CT scan is abnormal. Asked to evaluate    PMHX:  COVID infection  Bronchiectasis  HTN (hypertension)  HLD (hyperlipidemia)  Seizures  Dementia    PSHX:  no significant past surgical history       FAMILY HISTORY:  no significant past medical history in first degree relatives      SOCIAL HISTORY:  ; lives with     Pulmonary Medications:   albuterol/ipratropium for Nebulization 3 milliLiter(s) Nebulizer every 6 hours      Antimicrobials:  azithromycin  IVPB      azithromycin  IVPB 500 milliGRAM(s) IV Intermittent every 24 hours  cefTRIAXone   IVPB 1000 milliGRAM(s) IV Intermittent every 24 hours      Cardiology:  metoprolol succinate ER 50 milliGRAM(s) Oral daily      Other:  aspirin  chewable 81 milliGRAM(s) Oral daily  atorvastatin 10 milliGRAM(s) Oral at bedtime  donepezil 5 milliGRAM(s) Oral at bedtime  heparin   Injectable 5000 Unit(s) SubCutaneous every 8 hours      Prn:  MEDICATIONS  (PRN):    Allergies    No Known Allergies    HOME MEDICATIONS: see  H & P    REVIEW OF SYSTEMS:  Constitutional: As per HPI  HEENT: Within normal limits  CV: As per HPI  Resp: As per HPI  GI: Within normal limits   : Within normal limits  Musculoskeletal: right rib pain  Skin: Within normal limits  Neurological: dementia - new onset seizures  Psychiatric: Within normal limits  Endocrine: Within normal limits  Hematologic/Lymphatic: Within normal limits  Allergic/Immunologic: Within normal limits    [x] All other systems negative    OBJECTIVE:    PHYSICAL EXAM:  ICU Vital Signs Last 24 Hrs  T(C): 37.1 (24 Oct 2021 11:39), Max: 38.4 (23 Oct 2021 18:11)  T(F): 98.8 (24 Oct 2021 11:39), Max: 101.1 (23 Oct 2021 18:11)  HR: 89 (24 Oct 2021 11:39) (69 - 106)  BP: 144/85 (24 Oct 2021 11:39) (103/87 - 169/92)  BP(mean): 70 (23 Oct 2021 22:32) (70 - 102)  ABP: --  ABP(mean): --  RR: 20 (24 Oct 2021 11:39) (17 - 20)  SpO2: 96% (24 Oct 2021 11:39) (90% - 98%) on room air    General: Awake. Alert. Confused. Cooperative. No distress. Appears stated age 	  HEENT: Atraumatic. Normocephalic. Anicteric. Normal oral mucosa. PERRL. EOMI.  Neck: Supple. Trachea midline. Thyroid without enlargement/tenderness/nodules. No carotid bruit. No JVD.	  Cardiovascular: Regular rate and rhythm. S1 S2 normal. II/VI systolic murmur.  Respiratory: Respirations unlabored. Diffuse mild wheeze. No curvature.  Abdomen: Soft. Non-tender. Non-distended. No organomegaly. No masses. Normal bowel sounds.  Extremities: Warm to touch. No clubbing or cyanosis. No pedal edema.  Pulses: 2+ peripheral pulses all extremities.	  Skin: Normal skin color. No rashes or lesions. No ecchymoses. No cyanosis. Warm to touch.  Lymph Nodes: Cervical, supraclavicular and axillary nodes normal  Neurological: Moving all extremities. A and O x 1  Psychiatry: Appropriate mood and affect.      LABS:                          12.4   15.23 )-----------( 255      ( 24 Oct 2021 05:52 )             37.6     CBC    WBC  15.23 <==, 16.58 <==    Hemoglobin  12.4 <<==, 12.8 <<==    Hematocrit  37.6 <==, 40.1 <==    Platelets  255 <==, 280 <==      135  |  97  |  6<L>  ----------------------------<  125<H>    10-24  3.5   |  24  |  0.49<L>    LYTES    sodium  135 <==, 132 <==    potassium   3.5 <==, 3.8 <==    chloride  97 <==, 95 <==    carbon dioxide  24 <==, 22 <==    =============================================================================================  RENAL FUNCTION:    Creatinine:   0.49  <<==, 0.56  <<==    BUN:   6 <==, 8 <==    ============================================================================================    calcium   8.6 <==, 9.4 <==    phos   2.0 <==    mag   1.9 <==    ============================================================================================  LFTs    AST:   25 <==     ALT:  34  <==     AP:  197  <=    Bili:  1.0  <=    PT/INR - ( 23 Oct 2021 19:41 )   PT: 15.1 sec;   INR: 1.27 ratio       PTT - ( 23 Oct 2021 19:41 )  PTT:27.8 sec    Venous Blood Gas:  10-23 @ 19:22  7.42/43/25/28/40.5  VBG Lactate: 1.2    CARDIAC MARKERS ( 23 Oct 2021 14:23 )  CPK x     /CKMB x     /CKMB Units x        troponin <6 ng/L    MICROBIOLOGY:     Respiratory Viral Panel with COVID-19 by ORQUIDEA (10.23.21 @ 19:40)   Rapid RVP Result: [facility]   SARS-CoV-2: NotDetec:       Urinalysis Basic - ( 23 Oct 2021 18:49 )    Color: Yellow / Appearance: Clear / SG: >1.050 / pH: x  Gluc: x / Ketone: Moderate  / Bili: Negative / Urobili: Negative   Blood: x / Protein: 30 mg/dL / Nitrite: Negative   Leuk Esterase: Negative / RBC: 10 /hpf / WBC 1 /HPF   Sq Epi: x / Non Sq Epi: 16 /hpf / Bacteria: Negative    RADIOLOGY:  [x ] Chest radiographs reviewed and interpreted by me    < from: CT Chest w/ IV Cont (10.23.21 @ 16:48) >    EXAM:  CT CHEST IC                          EXAM:  CT ABDOMEN AND PELVIS IC                          PROCEDURE DATE:  10/23/2021      INTERPRETATION:  CLINICAL INFORMATION: 75-year-old female with dementia and seizure disorder status post fall on Coumadin with facial trauma.    COMPARISON: None.    CONTRAST/COMPLICATIONS:  IV Contrast: Omnipaque 350  90 cc administered   10 cc discarded  Oral Contrast: None  Complications: None reported    PROCEDURE:  CT of the Chest, Abdomen and Pelviswas performed.  Imaging was performed through the chest in the arterial phase followed by imaging of the abdomen and pelvis in the portal venous phase.  Sagittal and coronal reformats were performed.    FINDINGS:  CHEST:  LUNGS AND LARGE AIRWAYS: Patent central airways. Severe bronchiectasis, particularly upper lobe, with peribronchial vascular soft tissue thickening and scattered tree-in-bud opacities, likely impacted small airways. Multiple nodular opacities, mainly lower lobes, likely chronic.  PLEURA: No pleural effusion.  VESSELS: Within normal limits.  HEART: Heart size is normal. No pericardial effusion.  MEDIASTINUM AND JARON: Enlarged mediastinal and hilar lymph nodes. Precarinal lymph node 2 x 1.6 cm. Left hilar lymph node calcification 2.6 x 1.8 cm. Subcarinal lymph node 2.2 x 1.2 cm.  CHEST WALL AND LOWER NECK: Within normal limits.    ABDOMEN AND PELVIS:  LIVER: Within normal limits.  BILE DUCTS: Normal caliber.  GALLBLADDER: Within normal limits.  SPLEEN: Within normal limits.  PANCREAS: Within normal limits.  ADRENALS: Within normal limits.  KIDNEYS/URETERS: Within normal limits.    BLADDER: Within normal limits.  REPRODUCTIVE ORGANS: Uterus and adnexa within normal limits.    BOWEL: No bowel obstruction. Appendix not visualized.  PERITONEUM: No ascites.  VESSELS: Within normal limits.  RETROPERITONEUM/LYMPH NODES: No lymphadenopathy.  ABDOMINAL WALL: Within normal limits.  BONES: Degenerative change.    IMPRESSION:  Severe bronchiectasis with peribronchial vascular soft tissue thickening, scattered nodular opacities, and impacted small airways, all likely chronic lung disease.  Mediastinal and left hilar lymphadenopathy.  No traumatic injury of the thorax or abdomen identified.    --- End of Report ---    LISSETTE MATUTE MD; Attending Radiologist  This document has been electronically signed. Oct 23 2021  5:28PM    < end of copied text >  ---------------------------------------------------------------------------------------------------------------    < from: CT Head No Cont (10.23.21 @ 16:37) >    EXAM:  CT MAXILLOFACIAL                          EXAM:  CT CERVICAL SPINE                          EXAM:  CT BRAIN                          EXAM:  CT 3D RECONSTRUCT LA INGA                          PROCEDURE DATE:  10/23/2021      INTERPRETATION:  CT OF THE HEAD, MAXILLOFACIAL AND CERVICAL SPINE CT WITHOUT CONTRAST.    CLINICAL INDICATION: Status post fall, on Coumadin.    TECHNIQUE: Volumetric CT acquisition was performed through the brain and reviewed using brain and bone window technique. Axial noncontrast CT scans of maxillofacial region were performed. Sagittal and coronal reconstructions were obtained for both sets of images. Volumetric axial noncontrast images of the cervical spine were reconstructed in the axial, coronaland sagittal planes.  Dose optimization techniques were utilized including kVp/mA modulation along with iterative reconstructions.  3-D/MIPS images were obtained on a different workstation, reviewed and saved in PACS.      COMPARISON: No prior studies have been submitted for comparison.    FINDINGS:  Head CT:    There is prominence of the ventricles, sulci and cisterns of the brain which may be age related.  There are scattered white matter hypodensities which are nonspecific but most commonly represent chronic microvascular ischemic changes. There is no acute loss of gray-white differentiation.    There is no evidence of mass effect, midline shift, acute intracranial hemorrhage, or extra-axial collections.    There is no skull fracture. The visualized globes are symmetric bilaterally. The paranasal sinuses and tympanomastoid air cells are clear.      Maxillofacial CT:    The visualized globes are symmetric bilaterally and the lenses are normally situated. There is no preseptal edema or retrobulbar hematoma. The retro-ocular compartment including the intraconal/extraconal fat, extraocular muscles and optic nerve sheaths are intact.    There is minimally displaced acute left nasal bone fracture. The lamina papyracea are intact bilaterally as are the rest of the walls of the bony orbits.    The zygomatic arches, pterygoid plates and mandible are intact.      CT cervical spine:    There is maintenance of the usual cervical lordosis without fracture or traumatic malalignment. The vertebral body heights are maintained. The intervertebral disc spaces demonstrate loss of height at C5-C6.    The prevertebral soft tissues are normal.    The spinal canal is adequately patent without significant central canal narrowing.    Craniocervicaljunction C1-C2: Anatomic in alignment.    The spinous processes are in anatomic alignment.    There is uncovertebral joint spurring at C5-6 contributing to mild bilateral neural foraminal narrowing.    There are fibrotic changes in the lung apices with bronchiectasis.      IMPRESSION:    No evidence of skull fracture, intracranial hemorrhage or mass effect.    There is minimally displaced left nasal bone fracture.    No other facial bone fractures are seen.    There is no cervical spine fracture.    --- End of Report ---    AIDA FLORENCE MD; Attending Radiologist  This document has been electronically signed. Oct 23 2021  5:01PM    < end of copied text >  ---------------------------------------------------------------------------------------------------------------         [facility] - [facility] - CONSULT NOTE    HPI: 75 year old gentlewoman, lifelong non-smoker, with history of bronchiectasis. This summer, the patient developed new onset focal seizures punctuated by several seconds of loss of consciousness (with eyes open staring into space) with subtle movement of the right sided limbs. She is awaiting an outpatient EEG and MRI before starting anticonvulsant medications. She also has a history of HTN, HLD and (?) CVA (small intraparenchymal hemorrhage). The patient was sent to the ER from an urgent care center yesterday 2 days after a seizure resulting left nasal fracture. No other significant trauma has been found on "pan" CT scans. The patient has no recollection of the "fall".  She has been found to be febrile in the ER without chills or sweats. She has no shortness of breath on room air. She has had a persistent cough for many years that is no worse than usual and productive of scant sputum. She has no chest congestion or wheeze. She had right sided rib and hip pain that have resolved. She has no anterior chest pain/pressure or palpitations. Chest CT scan is abnormal. Asked to evaluate    PMHX:  COVID infection  Bronchiectasis  HTN (hypertension)  HLD (hyperlipidemia)  Seizures  Dementia    PSHX:  no significant past surgical history       FAMILY HISTORY:  no significant past medical history in first degree relatives      SOCIAL HISTORY:  ; lives with ; lifelong non-smoker    Pulmonary Medications:   albuterol/ipratropium for Nebulization 3 milliLiter(s) Nebulizer every 6 hours      Antimicrobials:  azithromycin  IVPB      azithromycin  IVPB 500 milliGRAM(s) IV Intermittent every 24 hours  cefTRIAXone   IVPB 1000 milliGRAM(s) IV Intermittent every 24 hours      Cardiology:  metoprolol succinate ER 50 milliGRAM(s) Oral daily      Other:  aspirin  chewable 81 milliGRAM(s) Oral daily  atorvastatin 10 milliGRAM(s) Oral at bedtime  donepezil 5 milliGRAM(s) Oral at bedtime  heparin   Injectable 5000 Unit(s) SubCutaneous every 8 hours      Prn:  MEDICATIONS  (PRN):    Allergies    No Known Allergies    HOME MEDICATIONS: see  H & P    REVIEW OF SYSTEMS:  Constitutional: As per HPI  HEENT: Within normal limits  CV: As per HPI  Resp: As per HPI  GI: Within normal limits   : Within normal limits  Musculoskeletal: right rib pain  Skin: Within normal limits  Neurological: dementia - new onset seizures  Psychiatric: Within normal limits  Endocrine: Within normal limits  Hematologic/Lymphatic: Within normal limits  Allergic/Immunologic: Within normal limits    [x] All other systems negative    OBJECTIVE:    PHYSICAL EXAM:  ICU Vital Signs Last 24 Hrs  T(C): 37.1 (24 Oct 2021 11:39), Max: 38.4 (23 Oct 2021 18:11)  T(F): 98.8 (24 Oct 2021 11:39), Max: 101.1 (23 Oct 2021 18:11)  HR: 89 (24 Oct 2021 11:39) (69 - 106)  BP: 144/85 (24 Oct 2021 11:39) (103/87 - 169/92)  BP(mean): 70 (23 Oct 2021 22:32) (70 - 102)  ABP: --  ABP(mean): --  RR: 20 (24 Oct 2021 11:39) (17 - 20)  SpO2: 96% (24 Oct 2021 11:39) (90% - 98%) on room air    General: Awake. Alert. Confused. Cooperative. No distress. Appears stated age 	  HEENT: Atraumatic. Normocephalic. Anicteric. Normal oral mucosa. PERRL. EOMI.  Neck: Supple. Trachea midline. Thyroid without enlargement/tenderness/nodules. No carotid bruit. No JVD.	  Cardiovascular: Regular rate and rhythm. S1 S2 normal. II/VI systolic murmur.  Respiratory: Respirations unlabored. Diffuse mild wheeze. No curvature.  Abdomen: Soft. Non-tender. Non-distended. No organomegaly. No masses. Normal bowel sounds.  Extremities: Warm to touch. No clubbing or cyanosis. No pedal edema.  Pulses: 2+ peripheral pulses all extremities.	  Skin: Normal skin color. No rashes or lesions. No ecchymoses. No cyanosis. Warm to touch.  Lymph Nodes: Cervical, supraclavicular and axillary nodes normal  Neurological: Moving all extremities. A and O x 1  Psychiatry: Appropriate mood and affect.      LABS:                          12.4   15.23 )-----------( 255      ( 24 Oct 2021 05:52 )             37.6     CBC    WBC  15.23 <==, 16.58 <==    Hemoglobin  12.4 <<==, 12.8 <<==    Hematocrit  37.6 <==, 40.1 <==    Platelets  255 <==, 280 <==      135  |  97  |  6<L>  ----------------------------<  125<H>    10-24  3.5   |  24  |  0.49<L>    LYTES    sodium  135 <==, 132 <==    potassium   3.5 <==, 3.8 <==    chloride  97 <==, 95 <==    carbon dioxide  24 <==, 22 <==    =============================================================================================  RENAL FUNCTION:    Creatinine:   0.49  <<==, 0.56  <<==    BUN:   6 <==, 8 <==    ============================================================================================    calcium   8.6 <==, 9.4 <==    phos   2.0 <==    mag   1.9 <==    ============================================================================================  LFTs    AST:   25 <==     ALT:  34  <==     AP:  197  <=    Bili:  1.0  <=    PT/INR - ( 23 Oct 2021 19:41 )   PT: 15.1 sec;   INR: 1.27 ratio       PTT - ( 23 Oct 2021 19:41 )  PTT:27.8 sec    Venous Blood Gas:  10-23 @ 19:22  7.42/43/25/28/40.5  VBG Lactate: 1.2    CARDIAC MARKERS ( 23 Oct 2021 14:23 )  CPK x     /CKMB x     /CKMB Units x        troponin <6 ng/L    MICROBIOLOGY:     Respiratory Viral Panel with COVID-19 by ORQUIDEA (10.23.21 @ 19:40)   Rapid RVP Result: NotDete   SARS-CoV-2: NotDetec:       Urinalysis Basic - ( 23 Oct 2021 18:49 )    Color: Yellow / Appearance: Clear / SG: >1.050 / pH: x  Gluc: x / Ketone: Moderate  / Bili: Negative / Urobili: Negative   Blood: x / Protein: 30 mg/dL / Nitrite: Negative   Leuk Esterase: Negative / RBC: 10 /hpf / WBC 1 /HPF   Sq Epi: x / Non Sq Epi: 16 /hpf / Bacteria: Negative    RADIOLOGY:  [x ] Chest radiographs reviewed and interpreted by me    < from: CT Chest w/ IV Cont (10.23.21 @ 16:48) >    EXAM:  CT CHEST IC                          EXAM:  CT ABDOMEN AND PELVIS IC                          PROCEDURE DATE:  10/23/2021      INTERPRETATION:  CLINICAL INFORMATION: 75-year-old female with dementia and seizure disorder status post fall on Coumadin with facial trauma.    COMPARISON: None.    CONTRAST/COMPLICATIONS:  IV Contrast: Omnipaque 350  90 cc administered   10 cc discarded  Oral Contrast: None  Complications: None reported    PROCEDURE:  CT of the Chest, Abdomen and Pelviswas performed.  Imaging was performed through the chest in the arterial phase followed by imaging of the abdomen and pelvis in the portal venous phase.  Sagittal and coronal reformats were performed.    FINDINGS:  CHEST:  LUNGS AND LARGE AIRWAYS: Patent central airways. Severe bronchiectasis, particularly upper lobe, with peribronchial vascular soft tissue thickening and scattered tree-in-bud opacities, likely impacted small airways. Multiple nodular opacities, mainly lower lobes, likely chronic.  PLEURA: No pleural effusion.  VESSELS: Within normal limits.  HEART: Heart size is normal. No pericardial effusion.  MEDIASTINUM AND JARON: Enlarged mediastinal and hilar lymph nodes. Precarinal lymph node 2 x 1.6 cm. Left hilar lymph node calcification 2.6 x 1.8 cm. Subcarinal lymph node 2.2 x 1.2 cm.  CHEST WALL AND LOWER NECK: Within normal limits.    ABDOMEN AND PELVIS:  LIVER: Within normal limits.  BILE DUCTS: Normal caliber.  GALLBLADDER: Within normal limits.  SPLEEN: Within normal limits.  PANCREAS: Within normal limits.  ADRENALS: Within normal limits.  KIDNEYS/URETERS: Within normal limits.    BLADDER: Within normal limits.  REPRODUCTIVE ORGANS: Uterus and adnexa within normal limits.    BOWEL: No bowel obstruction. Appendix not visualized.  PERITONEUM: No ascites.  VESSELS: Within normal limits.  RETROPERITONEUM/LYMPH NODES: No lymphadenopathy.  ABDOMINAL WALL: Within normal limits.  BONES: Degenerative change.    IMPRESSION:  Severe bronchiectasis with peribronchial vascular soft tissue thickening, scattered nodular opacities, and impacted small airways, all likely chronic lung disease.  Mediastinal and left hilar lymphadenopathy.  No traumatic injury of the thorax or abdomen identified.    --- End of Report ---    LISSETTE MATUTE MD; Attending Radiologist  This document has been electronically signed. Oct 23 2021  5:28PM    < end of copied text >  ---------------------------------------------------------------------------------------------------------------    < from: CT Head No Cont (10.23.21 @ 16:37) >    EXAM:  CT MAXILLOFACIAL                          EXAM:  CT CERVICAL SPINE                          EXAM:  CT BRAIN                          EXAM:  CT 3D RECONSTRUCT W JASCLEM                          PROCEDURE DATE:  10/23/2021      INTERPRETATION:  CT OF THE HEAD, MAXILLOFACIAL AND CERVICAL SPINE CT WITHOUT CONTRAST.    CLINICAL INDICATION: Status post fall, on Coumadin.    TECHNIQUE: Volumetric CT acquisition was performed through the brain and reviewed using brain and bone window technique. Axial noncontrast CT scans of maxillofacial region were performed. Sagittal and coronal reconstructions were obtained for both sets of images. Volumetric axial noncontrast images of the cervical spine were reconstructed in the axial, coronaland sagittal planes.  Dose optimization techniques were utilized including kVp/mA modulation along with iterative reconstructions.  3-D/MIPS images were obtained on a different workstation, reviewed and saved in PACS.      COMPARISON: No prior studies have been submitted for comparison.    FINDINGS:  Head CT:    There is prominence of the ventricles, sulci and cisterns of the brain which may be age related.  There are scattered white matter hypodensities which are nonspecific but most commonly represent chronic microvascular ischemic changes. There is no acute loss of gray-white differentiation.    There is no evidence of mass effect, midline shift, acute intracranial hemorrhage, or extra-axial collections.    There is no skull fracture. The visualized globes are symmetric bilaterally. The paranasal sinuses and tympanomastoid air cells are clear.      Maxillofacial CT:    The visualized globes are symmetric bilaterally and the lenses are normally situated. There is no preseptal edema or retrobulbar hematoma. The retro-ocular compartment including the intraconal/extraconal fat, extraocular muscles and optic nerve sheaths are intact.    There is minimally displaced acute left nasal bone fracture. The lamina papyracea are intact bilaterally as are the rest of the walls of the bony orbits.    The zygomatic arches, pterygoid plates and mandible are intact.      CT cervical spine:    There is maintenance of the usual cervical lordosis without fracture or traumatic malalignment. The vertebral body heights are maintained. The intervertebral disc spaces demonstrate loss of height at C5-C6.    The prevertebral soft tissues are normal.    The spinal canal is adequately patent without significant central canal narrowing.    Craniocervicaljunction C1-C2: Anatomic in alignment.    The spinous processes are in anatomic alignment.    There is uncovertebral joint spurring at C5-6 contributing to mild bilateral neural foraminal narrowing.    There are fibrotic changes in the lung apices with bronchiectasis.      IMPRESSION:    No evidence of skull fracture, intracranial hemorrhage or mass effect.    There is minimally displaced left nasal bone fracture.    No other facial bone fractures are seen.    There is no cervical spine fracture.    --- End of Report ---    AIDA FLORENCE MD; Attending Radiologist  This document has been electronically signed. Oct 23 2021  5:01PM    < end of copied text >  ---------------------------------------------------------------------------------------------------------------

## 2021-10-24 NOTE — PHYSICAL THERAPY INITIAL EVALUATION ADULT - PERTINENT HX OF CURRENT PROBLEM, REHAB EVAL
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 , HTN, on coumadin here for evaluation for fall 2 days ago, unwitnessed presumed fall, pt has abrasion on bridge of nose, as per son @ bedside pt was not found down, acting normal, went to  today for evaluation of nose and was told to come to ED. Pt does not remember fall but this is baseline as per son. Son states pt has been having chronic cough x months but recently ahs had some R sided chest pain while coughing.

## 2021-10-24 NOTE — CONSULT NOTE ADULT - CONSULT REASON
abnormal chest CT; bronchiectasis; pulmonary nodules abnormal chest CT; bronchiectasis; pulmonary nodules; (?) pneumonia

## 2021-10-24 NOTE — PHYSICAL THERAPY INITIAL EVALUATION ADULT - PRECAUTIONS/LIMITATIONS, REHAB EVAL
CT Head, C-Spine, Maxillofacial: There is minimally displaced left nasal bone fracture. CT Head, C-Spine, Maxillofacial: There is minimally displaced left nasal bone fracture./no known precautions/limitations

## 2021-10-25 ENCOUNTER — TRANSCRIPTION ENCOUNTER (OUTPATIENT)
Age: 76
End: 2021-10-25

## 2021-10-25 VITALS
OXYGEN SATURATION: 94 % | DIASTOLIC BLOOD PRESSURE: 73 MMHG | TEMPERATURE: 99 F | RESPIRATION RATE: 18 BRPM | HEART RATE: 83 BPM | SYSTOLIC BLOOD PRESSURE: 148 MMHG

## 2021-10-25 LAB
CULTURE RESULTS: SIGNIFICANT CHANGE UP
LEGIONELLA AG UR QL: NEGATIVE — SIGNIFICANT CHANGE UP
SPECIMEN SOURCE: SIGNIFICANT CHANGE UP

## 2021-10-25 PROCEDURE — 80053 COMPREHEN METABOLIC PANEL: CPT

## 2021-10-25 PROCEDURE — 85014 HEMATOCRIT: CPT

## 2021-10-25 PROCEDURE — 70486 CT MAXILLOFACIAL W/O DYE: CPT | Mod: MB

## 2021-10-25 PROCEDURE — 96374 THER/PROPH/DIAG INJ IV PUSH: CPT | Mod: XU

## 2021-10-25 PROCEDURE — 36415 COLL VENOUS BLD VENIPUNCTURE: CPT

## 2021-10-25 PROCEDURE — 84132 ASSAY OF SERUM POTASSIUM: CPT

## 2021-10-25 PROCEDURE — 71260 CT THORAX DX C+: CPT | Mod: MB

## 2021-10-25 PROCEDURE — 76377 3D RENDER W/INTRP POSTPROCES: CPT

## 2021-10-25 PROCEDURE — 82803 BLOOD GASES ANY COMBINATION: CPT

## 2021-10-25 PROCEDURE — 81001 URINALYSIS AUTO W/SCOPE: CPT

## 2021-10-25 PROCEDURE — U0005: CPT

## 2021-10-25 PROCEDURE — 87086 URINE CULTURE/COLONY COUNT: CPT

## 2021-10-25 PROCEDURE — 85027 COMPLETE CBC AUTOMATED: CPT

## 2021-10-25 PROCEDURE — 85025 COMPLETE CBC W/AUTO DIFF WBC: CPT

## 2021-10-25 PROCEDURE — 82435 ASSAY OF BLOOD CHLORIDE: CPT

## 2021-10-25 PROCEDURE — U0003: CPT

## 2021-10-25 PROCEDURE — G1004: CPT

## 2021-10-25 PROCEDURE — 0225U NFCT DS DNA&RNA 21 SARSCOV2: CPT

## 2021-10-25 PROCEDURE — 87449 NOS EACH ORGANISM AG IA: CPT

## 2021-10-25 PROCEDURE — 84443 ASSAY THYROID STIM HORMONE: CPT

## 2021-10-25 PROCEDURE — 86769 SARS-COV-2 COVID-19 ANTIBODY: CPT

## 2021-10-25 PROCEDURE — 82947 ASSAY GLUCOSE BLOOD QUANT: CPT

## 2021-10-25 PROCEDURE — 85610 PROTHROMBIN TIME: CPT

## 2021-10-25 PROCEDURE — 80048 BASIC METABOLIC PNL TOTAL CA: CPT

## 2021-10-25 PROCEDURE — 94640 AIRWAY INHALATION TREATMENT: CPT

## 2021-10-25 PROCEDURE — 82330 ASSAY OF CALCIUM: CPT

## 2021-10-25 PROCEDURE — 70450 CT HEAD/BRAIN W/O DYE: CPT | Mod: MG

## 2021-10-25 PROCEDURE — 72125 CT NECK SPINE W/O DYE: CPT | Mod: MG

## 2021-10-25 PROCEDURE — 74177 CT ABD & PELVIS W/CONTRAST: CPT | Mod: MB

## 2021-10-25 PROCEDURE — 93005 ELECTROCARDIOGRAM TRACING: CPT

## 2021-10-25 PROCEDURE — 82607 VITAMIN B-12: CPT

## 2021-10-25 PROCEDURE — 83605 ASSAY OF LACTIC ACID: CPT

## 2021-10-25 PROCEDURE — 85730 THROMBOPLASTIN TIME PARTIAL: CPT

## 2021-10-25 PROCEDURE — 97161 PT EVAL LOW COMPLEX 20 MIN: CPT

## 2021-10-25 PROCEDURE — 84100 ASSAY OF PHOSPHORUS: CPT

## 2021-10-25 PROCEDURE — 84484 ASSAY OF TROPONIN QUANT: CPT

## 2021-10-25 PROCEDURE — 99285 EMERGENCY DEPT VISIT HI MDM: CPT

## 2021-10-25 PROCEDURE — 85018 HEMOGLOBIN: CPT

## 2021-10-25 PROCEDURE — 83735 ASSAY OF MAGNESIUM: CPT

## 2021-10-25 PROCEDURE — 84295 ASSAY OF SERUM SODIUM: CPT

## 2021-10-25 PROCEDURE — 87040 BLOOD CULTURE FOR BACTERIA: CPT

## 2021-10-25 PROCEDURE — 82746 ASSAY OF FOLIC ACID SERUM: CPT

## 2021-10-25 RX ORDER — DONEPEZIL HYDROCHLORIDE 10 MG/1
1 TABLET, FILM COATED ORAL
Qty: 0 | Refills: 0 | DISCHARGE
Start: 2021-10-25

## 2021-10-25 RX ORDER — ASPIRIN/CALCIUM CARB/MAGNESIUM 324 MG
1 TABLET ORAL
Qty: 0 | Refills: 0 | DISCHARGE

## 2021-10-25 RX ORDER — METOPROLOL TARTRATE 50 MG
1 TABLET ORAL
Qty: 0 | Refills: 0 | DISCHARGE

## 2021-10-25 RX ORDER — METOPROLOL TARTRATE 50 MG
1 TABLET ORAL
Qty: 0 | Refills: 0 | DISCHARGE
Start: 2021-10-25

## 2021-10-25 RX ORDER — ALBUTEROL 90 UG/1
2 AEROSOL, METERED ORAL
Qty: 0 | Refills: 0 | DISCHARGE

## 2021-10-25 RX ORDER — DONEPEZIL HYDROCHLORIDE 10 MG/1
1 TABLET, FILM COATED ORAL
Qty: 0 | Refills: 0 | DISCHARGE

## 2021-10-25 RX ADMIN — Medication 3 MILLILITER(S): at 05:32

## 2021-10-25 RX ADMIN — Medication 50 MILLIGRAM(S): at 05:31

## 2021-10-25 RX ADMIN — Medication 3 MILLILITER(S): at 00:43

## 2021-10-25 RX ADMIN — HEPARIN SODIUM 5000 UNIT(S): 5000 INJECTION INTRAVENOUS; SUBCUTANEOUS at 05:31

## 2021-10-25 RX ADMIN — Medication 1200 MILLIGRAM(S): at 05:32

## 2021-10-25 RX ADMIN — Medication 81 MILLIGRAM(S): at 14:35

## 2021-10-25 NOTE — DISCHARGE NOTE NURSING/CASE MANAGEMENT/SOCIAL WORK - PATIENT PORTAL LINK FT
You can access the FollowMyHealth Patient Portal offered by [facility] by registering at the following website: [email or web address]. By joining [facility] [employer] [facility] FollowMyHealth portal, you will also be able to view your health information using other applications (apps) compatible with our system.

## 2021-10-25 NOTE — PROGRESS NOTE ADULT - ASSESSMENT
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 , HTN, on coumadin here for evaluation for fall 2 days ago, unwitnessed presumed fall, pt has abrasion on bridge of nose, as per son @ bedside pt was not found down, acting normal, went to  today for evaluation of nose and was told to come to ED. Pt does not remember fall but this is baseline as per son. As per daughter on phone, unsure of why pt is on coumadin. Pt endorses no symptoms today, states she feels well. Son states pt has been having chronic cough x months but recently ahs had some R sided chest pain while coughing.    fever  - c/w iv abx, can change to po levaquin  - no clear source  - follow up cultures    bronchirctasis  - stable oxygenation on room air  following fever curve and WBC count  ceftriaxone/doxycycline  await blood cultures, sputum cultures and urine Legionella antigen - would obtain sputum for AFB - no isolation needed -> looking for atypical mycobacterium infection  albuterol/atrovent nebs q6h  mucinex 1200mg 2 times daily  acapella device    seizure  - follow up with neurologist as out pt    dementia  - c/w Aricept    HLD  - c/w pravastatin
ASSESSMENT:    admitted following a seizure resulting in a fracture of the left nasal bone and a laceration on the bridge of her nose - no other significant injuries have been found on "pan" CT scans - seizures are of recent onset and she remains off anticonvulsants awaiting an outpatient EEG and MRI    was awaiting discharge from the ER but was found to have a fever -> 101.1F  - she has a leukocytosis and a chronic cough related to severe bronchiectasis - CT scan -> severe bronchiectasis with peribronchial vascular soft tissue thickening and scattered tree-in-bud opacities likely due to impacted small airways - there are multiple nodular opacities predominantly in the lower lobes - it is impossible to "rule out" a superimposed pneumonia on the chronically abnormal chest CT    10/25 -> the patient remains afebrile with a moderating leukocytosis - she is tolerating po and is clinically stable    PLAN/RECOMMENDATIONS:    stable oxygenation on room air  following fever curve and WBC count  will convert ceftriaxone/doxycycline to po levaquin x 5 more days  blood cultures and urine cultures are negative - patient is unable to expectorate sputum for AFB - no isolation needed -> looking for atypical mycobacterium infection - this can be done in the outpatient setting  discontinue albuterol/atrovent nebs q6h  discharge on mucinex DM 1200mg/60mg 2 times daily  discharge on an acapella device - patient instructed in the use of the device which is at bedside  cardiac meds: ASA/toprol XL/lipitor  DVT prophylaxis  outpatient neurology follow-up    Will follow with you. Plan of care discussed with the patient and her  at bedside, the dedicated floor PA and with Dr. Sharp. No pulmonary objection to discharge. She will follow-up with her outpatient pulmonologist in [place] as planned.    Aric Velazquez MD, [facility]  [phone]  Pulmonary Medicine      
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 , HTN, on coumadin here for evaluation for fall 2 days ago, unwitnessed presumed fall, pt has abrasion on bridge of nose, as per son @ bedside pt was not found down, acting normal, went to  today for evaluation of nose and was told to come to ED. Pt does not remember fall but this is baseline as per son. As per daughter on phone, unsure of why pt is on coumadin. Pt endorses no symptoms today, states she feels well. Son states pt has been having chronic cough x months but recently ahs had some R sided chest pain while coughing.    fever  - c/w iv abx  - no clear source  - follow up cultures    bronchirctasis  - pulm eval  - duonebs qid    seizure  - follow up with neurologist    dementia  - c/w aricept    HLD  - c/w pravastatin

## 2021-10-25 NOTE — DISCHARGE NOTE PROVIDER - NSDCCPCAREPLAN_GEN_ALL_CORE_FT
PRINCIPAL DISCHARGE DIAGNOSIS  Diagnosis: Pneumonia  Assessment and Plan of Treatment: Take antibiotics as prescribed  Follow up with your primary care provider

## 2021-10-25 NOTE — DISCHARGE NOTE PROVIDER - NSDCFUSCHEDAPPT_GEN_ALL_CORE_FT
JOSETTE BROWN ; 10/27/2021 ; NP MRI [id number]  JOSETTE BROWN ; 10/27/2021 ; [facility] PreAdmits  JOSETTE BROWN ; 11/15/2021 ; NP Neuro [phone] [facility]

## 2021-10-25 NOTE — DISCHARGE NOTE PROVIDER - HOSPITAL COURSE
admitted following a seizure resulting in a fracture of the left nasal bone and a laceration on the bridge of her nose - no other significant injuries have been found on "pan" CT scans - seizures are of recent onset and she remains off anticonvulsants awaiting an outpatient EEG and MRI  was awaiting discharge from the ER but was found to have a fever -> 101.1F  - she has a leukocytosis and a chronic cough related to severe bronchiectasis - CT scan -> severe bronchiectasis with peribronchial vascular soft tissue thickening and scattered tree-in-bud opacities likely due to impacted small airways - there are multiple nodular opacities predominantly in the lower lobes - it is impossible to "rule out" a superimposed pneumonia on the chronically abnormal chest CT  10/25 -> the patient remains afebrile with a moderating leukocytosis - she is tolerating po and is clinically stable  seen by pulm    stable oxygenation on room air following fever curve and WBC count will convert ceftriaxone/doxycycline to po levaquin x 5 more days  blood cultures and urine cultures are negative - patient is unable to expectorate sputum for AFB - no isolation needed -> looking for atypical mycobacterium infection - this can be done in the outpatient setting discontinue albuterol/atrovent nebs q6h discharge on mucinex DM 1200mg/60mg 2 times daily  discharge on an acapella device - patient instructed in the use of the device which is at bedside  cardiac meds: ASA/toprol XL/lipitor  DVT prophylaxis  outpatient neurology follow-up    Will follow with [facility] will follow-up with her outpatient pulmonologist in [place] as planned.

## 2021-10-25 NOTE — PROGRESS NOTE ADULT - SUBJECTIVE AND OBJECTIVE BOX
[facility] - PROGRESS NOTE    CHIEF COMPLAINT: abnormal chest CT; bronchiectasis; pulmonary nodules; pneumonia; - seizures with nose fracture    INTERVAL HISTORY: eager to go home sitting in the chair; chronic cough is at baseline with scant sputum production; no chest congestion or wheeze; no fevers, chills or sweats; no chest pain/pressure or palpitations; rib sided rib and hip pain have resolved    REVIEW OF SYSTEMS:  Constitutional: As per interval history  HEENT: Within normal limits  CV: As per interval history  Resp: As per interval history  GI: Within normal limits   : Within normal limits  Musculoskeletal: Within normal limits  Skin: Within normal limits  Neurological: dementia - new onset seizures  Psychiatric: Within normal limits  Endocrine: Within normal limits  Hematologic/Lymphatic: Within normal limits  Allergic/Immunologic: Within normal limits    MEDICATIONS:     Pulmonary "  albuterol/ipratropium for Nebulization 3 milliLiter(s) Nebulizer every 6 hours  guaiFENesin ER 1200 milliGRAM(s) Oral every 12 hours    Anti-microbials:  levoFLOXacin  Tablet 500 milliGRAM(s) Oral every 24 hours    Cardiovascular:  metoprolol succinate ER 50 milliGRAM(s) Oral daily    Other:  aspirin  chewable 81 milliGRAM(s) Oral daily  atorvastatin 10 milliGRAM(s) Oral at bedtime  donepezil 5 milliGRAM(s) Oral at bedtime  heparin   Injectable 5000 Unit(s) SubCutaneous every 8 hours  influenza   Vaccine 0.5 milliLiter(s) IntraMuscular once    MEDICATIONS  (PRN):    OBJECTIVE:    I&O's Detail    24 Oct 2021 07:01  -  25 Oct 2021 07:00  --------------------------------------------------------  IN:    IV PiggyBack: 250 mL  Total IN: 250 mL    OUT:    Voided (mL): 450 mL  Total OUT: 450 mL    Total NET: -200 mL      25 Oct 2021 07:01  -  25 Oct 2021 12:24  --------------------------------------------------------  IN:    Oral Fluid: 240 mL  Total IN: 240 mL    OUT:  Total OUT: 0 mL    Total NET: 240 mL    PHYSICAL EXAM:       ICU Vital Signs Last 24 Hrs  T(C): 37.1 (25 Oct 2021 12:12), Max: 37.2 (25 Oct 2021 01:25)  T(F): 98.7 (25 Oct 2021 12:12), Max: 98.9 (25 Oct 2021 01:25)  HR: 83 (25 Oct 2021 12:12) (77 - 92)  BP: 148/73 (25 Oct 2021 12:12) (115/58 - 157/82)  BP(mean): --  ABP: --  ABP(mean): --  RR: 18 (25 Oct 2021 12:12) (18 - 20)  SpO2: 94% (25 Oct 2021 12:12) (94% - 97%) on room air     General: Awake. Alert. Confused. Cooperative. No distress. Appears stated age 	  HEENT: Atraumatic. Normocephalic. Anicteric. Normal oral mucosa. PERRL. EOMI.  Neck: Supple. Trachea midline. Thyroid without enlargement/tenderness/nodules. No carotid bruit. No JVD.	  Cardiovascular: Regular rate and rhythm. S1 S2 normal. II/VI systolic murmur.  Respiratory: Respirations unlabored. Faint bibasilar rales. No curvature.  Abdomen: Soft. Non-tender. Non-distended. No organomegaly. No masses. Normal bowel sounds.  Extremities: Warm to touch. No clubbing or cyanosis. No pedal edema.  Pulses: 2+ peripheral pulses all extremities.	  Skin: Normal skin color. No rashes or lesions. No ecchymoses. No cyanosis. Warm to touch.  Lymph Nodes: Cervical, supraclavicular and axillary nodes normal  Neurological: Moving all extremities. A and O x 1  Psychiatry: Appropriate mood and affect.    LABS:                          12.4   15.23 )-----------( 255      ( 24 Oct 2021 05:52 )             37.6     CBC    WBC  15.23 <==, 16.58 <==    Hemoglobin  12.4 <<==, 12.8 <<==    Hematocrit  37.6 <==, 40.1 <==    Platelets  255 <==, 280 <==      135  |  97  |  6<L>  ----------------------------<  125<H>    10-24  3.5   |  24  |  0.49<L>      LYTES    sodium  135 <==, 132 <==    potassium   3.5 <==, 3.8 <==    chloride  97 <==, 95 <==    carbon dioxide  24 <==, 22 <==    =============================================================================================  RENAL FUNCTION:    Creatinine:   0.49  <<==, 0.56  <<==    BUN:   6 <==, 8 <==    ============================================================================================    calcium   8.6 <==, 9.4 <==    phos   2.0 <==    mag   1.9 <==    ============================================================================================  LFTs    AST:   25 <==     ALT:  34  <==     AP:  197  <=    Bili:  1.0  <=    PT/INR - ( 23 Oct 2021 19:41 )   PT: 15.1 sec;   INR: 1.27 ratio       PTT - ( 23 Oct 2021 19:41 )  PTT:27.8 sec    Venous Blood Gas:  10-23 @ 19:22  7.42/43/25/28/40.5  VBG Lactate: 1.2    CARDIAC MARKERS ( 23 Oct 2021 14:23 )  CPK x     /CKMB x     /CKMB Units x        troponin <6 ng/L    < from: Transthoracic Echocardiogram (08.03.21 @ 07:42) >    Patient name: JOSETTE BROWN  YOB: 1945   Age: 75 (F)   MR#: [id number]  Study Date: 8/3/2021  Location: [address] [place], [place] [address]Sonographer: Marlon Gtz [facility]  Study quality: Fair  Referring Physician: Ang Elliott MD  Blood Pressure: 133/60 mmHg  Height: 172 cm  Weight: 49 kg  BSA: 1.6 m2  ------------------------------------------------------------------------    PROCEDURE: Transthoracic echocardiogram with 2-D, M-Mode  and complete spectral and color flow Doppler.  INDICATION: Syncope and Collapse (R55)  HISTORY:  ------------------------------------------------------------------------  DIMENSIONS:  Dimensions:     Normal Values:  LA:     2.8 cm    2.0 - 4.0 cm  Ao:     2.9 cm    2.0 - 3.8 cm  SEPTUM: 0.8 cm    0.6 - 1.2 cm  PWT:    0.7 cm    0.6 - 1.1 cm  LVIDd:  4.2 cm    3.0 - 5.6 cm  LVIDs:  2.5 cm    1.8 - 4.0 cm      Derived Variables:  LVMI: 59 g/m2  RWT: 0.33  Ejection Fraction Visual Estimate: >55 %    ------------------------------------------------------------------------  OBSERVATIONS:  Mitral Valve: Normal mitral valve. Trace mitral  regurgitation.  Aortic Root: Normal aortic root.  Aortic Valve: Normal trileaflet aortic valve. Trace aortic  regurgitation.  Left Atrium: Normal left atrium.  LA volume index = 19  cc/m2.  Left Ventricle: Endocardium not well visualized; grossly  normal left ventricular systolic function. Normal left  ventricular internal dimensions and wall thicknesses.  Right Heart: Normal right atrium. Normal right ventricular  size and function.There is mild tricuspid regurgitation.  There is trace pulmonic regurgitation.  Pericardium/PleuraNormal pericardium with no pericardial  effusion.  Hemodynamic: RA Pressure is 8 mm Hg. RV systolic pressure  is 29 mm Hg.  ------------------------------------------------------------------------  CONCLUSIONS:  1. Trace mitral regurgitation.  2.  Trace aortic regurgitation.  3. Normal left ventricular internal dimensions and wall  thicknesses.  4. Endocardium not well visualized; grossly normal left  ventricular systolic function.  5. Normal right ventricular size and function.    ------------------------------------------------------------------------  Confirmed on  8/4/2021 - 13:44:52 by Zi Poon MD  ------------------------------------------------------------------------    < end of copied text >  ---------------------------------------------------------------------------------------------------------------    MICROBIOLOGY:     Respiratory Viral Panel with COVID-19 by ORQUIDEA (10.23.21 @ 19:40)   Rapid RVP Result: NotDetec   SARS-CoV-2: NotDetec:     Urinalysis Basic - ( 23 Oct 2021 18:49 )    Color: Yellow / Appearance: Clear / SG: >1.050 / pH: x  Gluc: x / Ketone: Moderate  / Bili: Negative / Urobili: Negative   Blood: x / Protein: 30 mg/dL / Nitrite: Negative   Leuk Esterase: Negative / RBC: 10 /hpf / WBC 1 /HPF   Sq Epi: x / Non Sq Epi: 16 /hpf / Bacteria: Negative    Culture - Urine (10.23.21 @ 22:06)   Specimen Source: Catheterized Catheterized   Culture Results:   <10,000 CFU/mL Normal Urogenital Debora     Culture - Blood (10.23.21 @ 22:07)   Specimen Source: .Blood Blood   Culture Results:   No growth to date.     Culture - Blood (10.23.21 @ 22:07)   Specimen Source: .Blood Blood   Culture Results:   No growth to date.     RADIOLOGY:  [x] Chest radiographs reviewed and interpreted by me    < from: CT Chest w/ IV Cont (10.23.21 @ 16:48) >    EXAM:  CT CHEST IC                          EXAM:  CT ABDOMEN AND PELVIS IC                          PROCEDURE DATE:  10/23/2021      INTERPRETATION:  CLINICAL INFORMATION: 75-year-old female with dementia and seizure disorder status post fall on Coumadin with facial trauma.    COMPARISON: None.    CONTRAST/COMPLICATIONS:  IV Contrast: Omnipaque 350  90 cc administered   10 cc discarded  Oral Contrast: None  Complications: None reported    PROCEDURE:  CT of the Chest, Abdomen and Pelviswas performed.  Imaging was performed through the chest in the arterial phase followed by imaging of the abdomen and pelvis in the portal venous phase.  Sagittal and coronal reformats were performed.    FINDINGS:  CHEST:  LUNGS AND LARGE AIRWAYS: Patent central airways. Severe bronchiectasis, particularly upper lobe, with peribronchial vascular soft tissue thickening and scattered tree-in-bud opacities, likely impacted small airways. Multiple nodular opacities, mainly lower lobes, likely chronic.  PLEURA: No pleural effusion.  VESSELS: Within normal limits.  HEART: Heart size is normal. No pericardial effusion.  MEDIASTINUM AND JARON: Enlarged mediastinal and hilar lymph nodes. Precarinal lymph node 2 x 1.6 cm. Left hilar lymph node calcification 2.6 x 1.8 cm. Subcarinal lymph node 2.2 x 1.2 cm.  CHEST WALL AND LOWER NECK: Within normal limits.    ABDOMEN AND PELVIS:  LIVER: Within normal limits.  BILE DUCTS: Normal caliber.  GALLBLADDER: Within normal limits.  SPLEEN: Within normal limits.  PANCREAS: Within normal limits.  ADRENALS: Within normal limits.  KIDNEYS/URETERS: Within normal limits.    BLADDER: Within normal limits.  REPRODUCTIVE ORGANS: Uterus and adnexa within normal limits.    BOWEL: No bowel obstruction. Appendix not visualized.  PERITONEUM: No ascites.  VESSELS: Within normal limits.  RETROPERITONEUM/LYMPH NODES: No lymphadenopathy.  ABDOMINAL WALL: Within normal limits.  BONES: Degenerative change.    IMPRESSION:  Severe bronchiectasis with peribronchial vascular soft tissue thickening, scattered nodular opacities, and impacted small airways, all likely chronic lung disease.  Mediastinal and left hilar lymphadenopathy.  No traumatic injury of the thorax or abdomen identified.    --- End of Report ---    LISSETTE MATUTE MD; Attending Radiologist  This document has been electronically signed. Oct 23 2021  5:28PM    < end of copied text >  ---------------------------------------------------------------------------------------------------------------    < from: CT Head No Cont (10.23.21 @ 16:37) >    EXAM:  CT MAXILLOFACIAL                          EXAM:  CT CERVICAL SPINE                          EXAM:  CT BRAIN                          EXAM:  CT 3D RECONSTRUCT W INGA                          PROCEDURE DATE:  10/23/2021      INTERPRETATION:  CT OF THE HEAD, MAXILLOFACIAL AND CERVICAL SPINE CT WITHOUT CONTRAST.    CLINICAL INDICATION: Status post fall, on Coumadin.    TECHNIQUE: Volumetric CT acquisition was performed through the brain and reviewed using brain and bone window technique. Axial noncontrast CT scans of maxillofacial region were performed. Sagittal and coronal reconstructions were obtained for both sets of images. Volumetric axial noncontrast images of the cervical spine were reconstructed in the axial, coronaland sagittal planes.  Dose optimization techniques were utilized including kVp/mA modulation along with iterative reconstructions.  3-D/MIPS images were obtained on a different workstation, reviewed and saved in PACS.      COMPARISON: No prior studies have been submitted for comparison.    FINDINGS:  Head CT:    There is prominence of the ventricles, sulci and cisterns of the brain which may be age related.  There are scattered white matter hypodensities which are nonspecific but most commonly represent chronic microvascular ischemic changes. There is no acute loss of gray-white differentiation.    There is no evidence of mass effect, midline shift, acute intracranial hemorrhage, or extra-axial collections.    There is no skull fracture. The visualized globes are symmetric bilaterally. The paranasal sinuses and tympanomastoid air cells are clear.      Maxillofacial CT:    The visualized globes are symmetric bilaterally and the lenses are normally situated. There is no preseptal edema or retrobulbar hematoma. The retro-ocular compartment including the intraconal/extraconal fat, extraocular muscles and optic nerve sheaths are intact.    There is minimally displaced acute left nasal bone fracture. The lamina papyracea are intact bilaterally as are the rest of the walls of the bony orbits.    The zygomatic arches, pterygoid plates and mandible are intact.      CT cervical spine:    There is maintenance of the usual cervical lordosis without fracture or traumatic malalignment. The vertebral body heights are maintained. The intervertebral disc spaces demonstrate loss of height at C5-C6.    The prevertebral soft tissues are normal.    The spinal canal is adequately patent without significant central canal narrowing.    Craniocervicaljunction C1-C2: Anatomic in alignment.    The spinous processes are in anatomic alignment.    There is uncovertebral joint spurring at C5-6 contributing to mild bilateral neural foraminal narrowing.    There are fibrotic changes in the lung apices with bronchiectasis.      IMPRESSION:    No evidence of skull fracture, intracranial hemorrhage or mass effect.    There is minimally displaced left nasal bone fracture.    No other facial bone fractures are seen.    There is no cervical spine fracture.    --- End of Report ---    AIDA FLORENCE MD; Attending Radiologist  This document has been electronically signed. Oct 23 2021  5:01PM    < end of copied text >  ---------------------------------------------------------------------------------------------------------------      
DATE OF SERVICE: 10-24-21 @ 13:10    Patient is a 75y old  Female who presents with a chief complaint of s/p fall, fever (23 Oct 2021 19:49)      SUBJECTIVE / OVERNIGHT EVENTS:  No chest pain. No shortness of breath. No complaints. No events overnight. pt feels well and wants to go home.   at bedside    MEDICATIONS  (STANDING):  albuterol/ipratropium for Nebulization 3 milliLiter(s) Nebulizer every 6 hours  aspirin  chewable 81 milliGRAM(s) Oral daily  atorvastatin 10 milliGRAM(s) Oral at bedtime  azithromycin  IVPB      azithromycin  IVPB 500 milliGRAM(s) IV Intermittent every 24 hours  cefTRIAXone   IVPB 1000 milliGRAM(s) IV Intermittent every 24 hours  donepezil 5 milliGRAM(s) Oral at bedtime  heparin   Injectable 5000 Unit(s) SubCutaneous every 8 hours  metoprolol succinate ER 50 milliGRAM(s) Oral daily  potassium phosphate IVPB 30 milliMole(s) IV Intermittent once    MEDICATIONS  (PRN):      Vital Signs Last 24 Hrs  T(C): 37.1 (24 Oct 2021 11:39), Max: 38.4 (23 Oct 2021 18:11)  T(F): 98.8 (24 Oct 2021 11:39), Max: 101.1 (23 Oct 2021 18:11)  HR: 89 (24 Oct 2021 11:39) (69 - 106)  BP: 144/85 (24 Oct 2021 11:39) (103/87 - 169/92)  BP(mean): 70 (23 Oct 2021 22:32) (70 - 102)  RR: 20 (24 Oct 2021 11:39) (17 - 20)  SpO2: 96% (24 Oct 2021 11:39) (90% - 98%)  CAPILLARY BLOOD GLUCOSE        I&O's Summary      PHYSICAL EXAM:  GENERAL: NAD, well-developed  HEAD:  Atraumatic, Normocephalic  EYES: EOMI, PERRLA, conjunctiva and sclera clear  NECK: Supple, No JVD  CHEST/LUNG: Clear to auscultation bilaterally; No wheeze  HEART: Regular rate and rhythm; No murmurs, rubs, or gallops  ABDOMEN: Soft, Nontender, Nondistended; Bowel sounds present  EXTREMITIES:  2+ Peripheral Pulses, No clubbing, cyanosis, or edema  PSYCH: AAOx3  NEUROLOGY: non-focal  SKIN: No rashes or lesions    LABS:                        12.4   15.23 )-----------( 255      ( 24 Oct 2021 05:52 )             37.6     10-24    135  |  97  |  6<L>  ----------------------------<  125<H>  3.5   |  24  |  0.49<L>    Ca    8.6      24 Oct 2021 05:52  Phos  2.0     10-24  Mg     1.9     10-24    TPro  8.3  /  Alb  4.0  /  TBili  1.0  /  DBili  x   /  AST  25  /  ALT  34  /  AlkPhos  197<H>  10-23    PT/INR - ( 23 Oct 2021 19:41 )   PT: 15.1 sec;   INR: 1.27 ratio         PTT - ( 23 Oct 2021 19:41 )  PTT:27.8 sec      Urinalysis Basic - ( 23 Oct 2021 18:49 )    Color: Yellow / Appearance: Clear / SG: >1.050 / pH: x  Gluc: x / Ketone: Moderate  / Bili: Negative / Urobili: Negative   Blood: x / Protein: 30 mg/dL / Nitrite: Negative   Leuk Esterase: Negative / RBC: 10 /hpf / WBC 1 /HPF   Sq Epi: x / Non Sq Epi: 16 /hpf / Bacteria: Negative        RADIOLOGY & ADDITIONAL TESTS:    Imaging Personally Reviewed:    Consultant(s) Notes Reviewed:      Care Discussed with Consultants/Other Providers:  
DATE OF SERVICE: 10-25-21 @ 12:33    Patient is a 75y old  Female who presents with a chief complaint of seizures with nose fracture (24 Oct 2021 13:58)      SUBJECTIVE / OVERNIGHT EVENTS:  No chest pain. No shortness of breath. No complaints. No events overnight. dressed to go home.   at bedside    MEDICATIONS  (STANDING):  albuterol/ipratropium for Nebulization 3 milliLiter(s) Nebulizer every 6 hours  aspirin  chewable 81 milliGRAM(s) Oral daily  atorvastatin 10 milliGRAM(s) Oral at bedtime  donepezil 5 milliGRAM(s) Oral at bedtime  guaiFENesin ER 1200 milliGRAM(s) Oral every 12 hours  heparin   Injectable 5000 Unit(s) SubCutaneous every 8 hours  influenza   Vaccine 0.5 milliLiter(s) IntraMuscular once  levoFLOXacin  Tablet 500 milliGRAM(s) Oral every 24 hours  metoprolol succinate ER 50 milliGRAM(s) Oral daily    MEDICATIONS  (PRN):      Vital Signs Last 24 Hrs  T(C): 37.1 (25 Oct 2021 12:12), Max: 37.2 (25 Oct 2021 01:25)  T(F): 98.7 (25 Oct 2021 12:12), Max: 98.9 (25 Oct 2021 01:25)  HR: 83 (25 Oct 2021 12:12) (77 - 92)  BP: 148/73 (25 Oct 2021 12:12) (115/58 - 157/82)  BP(mean): --  RR: 18 (25 Oct 2021 12:12) (18 - 20)  SpO2: 94% (25 Oct 2021 12:12) (94% - 97%)  CAPILLARY BLOOD GLUCOSE        I&O's Summary    24 Oct 2021 07:01  -  25 Oct 2021 07:00  --------------------------------------------------------  IN: 250 mL / OUT: 450 mL / NET: -200 mL    25 Oct 2021 07:01  -  25 Oct 2021 12:33  --------------------------------------------------------  IN: 240 mL / OUT: 0 mL / NET: 240 mL        PHYSICAL EXAM:  GENERAL: NAD, well-developed  HEAD:  Atraumatic, Normocephalic  EYES: EOMI, PERRLA, conjunctiva and sclera clear  NECK: Supple, No JVD  CHEST/LUNG: Clear to auscultation bilaterally; No wheeze  HEART: Regular rate and rhythm; No murmurs, rubs, or gallops  ABDOMEN: Soft, Nontender, Nondistended; Bowel sounds present  EXTREMITIES:  2+ Peripheral Pulses, No clubbing, cyanosis, or edema  PSYCH: AAOx3  NEUROLOGY: non-focal  SKIN: No rashes or lesions    LABS:                        12.4   15.23 )-----------( 255      ( 24 Oct 2021 05:52 )             37.6     10-24    135  |  97  |  6<L>  ----------------------------<  125<H>  3.5   |  24  |  0.49<L>    Ca    8.6      24 Oct 2021 05:52  Phos  2.0     10-24  Mg     1.9     10-24    TPro  8.3  /  Alb  4.0  /  TBili  1.0  /  DBili  x   /  AST  25  /  ALT  34  /  AlkPhos  197<H>  10-23    PT/INR - ( 23 Oct 2021 19:41 )   PT: 15.1 sec;   INR: 1.27 ratio         PTT - ( 23 Oct 2021 19:41 )  PTT:27.8 sec      Urinalysis Basic - ( 23 Oct 2021 18:49 )    Color: Yellow / Appearance: Clear / SG: >1.050 / pH: x  Gluc: x / Ketone: Moderate  / Bili: Negative / Urobili: Negative   Blood: x / Protein: 30 mg/dL / Nitrite: Negative   Leuk Esterase: Negative / RBC: 10 /hpf / WBC 1 /HPF   Sq Epi: x / Non Sq Epi: 16 /hpf / Bacteria: Negative        RADIOLOGY & ADDITIONAL TESTS:    Imaging Personally Reviewed:    Consultant(s) Notes Reviewed:      Care Discussed with Consultants/Other Providers:

## 2021-10-25 NOTE — DISCHARGE NOTE PROVIDER - CARE PROVIDER_API CALL
Daljit Caldwell  INTERNAL MEDICINE  [address] [place] [address]  Phone: [phone]  Fax: [phone]  Follow Up Time:

## 2021-10-25 NOTE — DISCHARGE NOTE PROVIDER - NSDCMRMEDTOKEN_GEN_ALL_CORE_FT
aspirin 81 mg oral tablet, chewable: 1 tab(s) orally once a day  donepezil 5 mg oral tablet: 1 tab(s) orally once a day (at bedtime)  guaiFENesin 1200 mg oral tablet, extended release: 1 tab(s) orally every 12 hours  levoFLOXacin 500 mg oral tablet: 1 tab(s) orally every 24 hours  metoprolol succinate 50 mg oral tablet, extended release: 1 tab(s) orally once a day  pravastatin 20 mg oral tablet: 1 tab(s) orally once a day

## 2021-10-25 NOTE — DISCHARGE NOTE PROVIDER - EXTENDED VTE YES NO FOR MLM ENOXAPARIN
PROCEDURE NOTE: Punctal Plugs, Temporary #2 Bilateral Lower Lids. Diagnosis: RENATO. Anesthesia: Topical. Prep: Antibiotic Drops q 5min x 3. Prior to treatment, the risks/benefits/alternatives were discussed. The patient wished to proceed with procedure. Temporary collagen plugs were inserted. Patient tolerated procedure well. There were no complications. Post procedure instructions given. Troy Bull
,

## 2021-10-26 PROBLEM — F03.90 UNSPECIFIED DEMENTIA WITHOUT BEHAVIORAL DISTURBANCE: Chronic | Status: ACTIVE | Noted: 2021-10-23

## 2021-10-26 PROBLEM — R56.9 UNSPECIFIED CONVULSIONS: Chronic | Status: ACTIVE | Noted: 2021-10-23

## 2021-10-26 PROBLEM — I10 ESSENTIAL (PRIMARY) HYPERTENSION: Chronic | Status: ACTIVE | Noted: 2021-10-23

## 2021-10-26 RX ORDER — PHENAZOPYRIDINE HCL 100 MG
1 TABLET ORAL
Qty: 0 | Refills: 0 | DISCHARGE

## 2021-10-26 RX ORDER — METHENAMINE MANDELATE 1 G
1 TABLET ORAL
Qty: 0 | Refills: 0 | DISCHARGE

## 2021-10-26 RX ORDER — DONEPEZIL HYDROCHLORIDE 10 MG/1
1 TABLET, FILM COATED ORAL
Qty: 0 | Refills: 0 | DISCHARGE

## 2021-10-26 RX ORDER — METOPROLOL TARTRATE 50 MG
1 TABLET ORAL
Qty: 0 | Refills: 0 | DISCHARGE

## 2021-10-27 ENCOUNTER — RESULT REVIEW (OUTPATIENT)
Age: 76
End: 2021-10-27

## 2021-10-27 ENCOUNTER — APPOINTMENT (OUTPATIENT)
Dept: MRI IMAGING | Facility: HOSPITAL | Age: 76
End: 2021-10-27

## 2021-10-27 ENCOUNTER — OUTPATIENT (OUTPATIENT)
Dept: OUTPATIENT SERVICES | Facility: HOSPITAL | Age: 76
LOS: 1 days | End: 2021-10-27
Payer: COMMERCIAL

## 2021-10-27 DIAGNOSIS — R56.9 UNSPECIFIED CONVULSIONS: ICD-10-CM

## 2021-10-27 PROCEDURE — 95957 EEG DIGITAL ANALYSIS: CPT

## 2021-10-27 PROCEDURE — 95819 EEG AWAKE AND ASLEEP: CPT

## 2021-10-27 PROCEDURE — 70553 MRI BRAIN STEM W/O & W/DYE: CPT

## 2021-10-27 PROCEDURE — 70553 MRI BRAIN STEM W/O & W/DYE: CPT | Mod: 26

## 2021-10-28 LAB
CULTURE RESULTS: SIGNIFICANT CHANGE UP
CULTURE RESULTS: SIGNIFICANT CHANGE UP
SPECIMEN SOURCE: SIGNIFICANT CHANGE UP
SPECIMEN SOURCE: SIGNIFICANT CHANGE UP

## 2021-11-16 ENCOUNTER — APPOINTMENT (OUTPATIENT)
Dept: NEUROLOGY | Facility: CLINIC | Age: 76
End: 2021-11-16
Payer: MEDICARE

## 2021-11-16 VITALS
HEIGHT: 64 IN | OXYGEN SATURATION: 96 % | WEIGHT: 102 LBS | HEART RATE: 88 BPM | SYSTOLIC BLOOD PRESSURE: 162 MMHG | DIASTOLIC BLOOD PRESSURE: 89 MMHG | TEMPERATURE: 97.9 F | BODY MASS INDEX: 17.42 KG/M2

## 2021-11-16 DIAGNOSIS — I67.9 CEREBROVASCULAR DISEASE, UNSPECIFIED: ICD-10-CM

## 2021-11-16 DIAGNOSIS — I67.2 CEREBRAL ATHEROSCLEROSIS: ICD-10-CM

## 2021-11-16 PROCEDURE — 99215 OFFICE O/P EST HI 40 MIN: CPT

## 2021-11-17 PROBLEM — E78.00 PURE HYPERCHOLESTEROLEMIA, UNSPECIFIED: Chronic | Status: ACTIVE | Noted: 2021-05-31

## 2021-11-17 PROBLEM — I10 ESSENTIAL (PRIMARY) HYPERTENSION: Chronic | Status: ACTIVE | Noted: 2021-05-31

## 2021-12-22 NOTE — DISCUSSION/SUMMARY
[FreeTextEntry1] : Discussed the moderate small vessel ischemic disease seen on MRI scan.  Discussed the abnormal asymmetric EEG without epileptiform discharge.  Based on the abnormal MRI and EEG is prudent to continue levetiracetam 500 mg twice a day.  We will check baseline levetiracetam level to adjust the dose at follow-up.

## 2021-12-22 NOTE — DATA REVIEWED
[de-identified] : EXAM:  MR BRAIN WAW IC\par \par \par PROCEDURE DATE:  10/27/2021\par \par \par \par INTERPRETATION:  CLINICAL INDICATIONS: r56.9 unspecified convulsion\par \par COMPARISON: CT head dated 10/23/2021\par \par TECHNIQUE: MRI brain: Multiplanar, multisequence MR imaging of the brain are obtained with and without the administration of 5.5 cc intravenous Gadavist contrast. 2.5 cc of contrast was discarded\par \par FINDINGS:\par No abnormal leptomeningeal or parenchymal enhancement.\par There is no abnormal restricted diffusion to suggest acute infarction.\par Scattered periventricular and subcortical white matter T2 /FLAIR hyperintensities are seen without mass effect, nonspecific, likely representing moderate chronic microvascular changes.\par Normal T2 flow-voids are seen within  the intracranial vasculature. The lateral ventricles and cortical sulci are age-appropriate in size and configuration. There is no mass, mass effect, or extra-axial fluid collection. There is no susceptibility artifact to suggest hemorrhage. Midline structures are normal.  The visualized paranasal sinuses, mastoid air cells and orbits are unremarkable.\par \par \par IMPRESSION: Moderate chronic microvascular changes. No acute intracranial pathology. No abnormal enhancement.\par \par \par --- End of Report ---\par \par \par \par \par \par HEIDY KLINE MD; Attending Radiologist\par This document has been electronically signed. Oct 27  [de-identified] : Findings: The background was continuous, spontaneously variable and reactive.\par During wakefulness, the posterior dominant rhythm consisted of 7 Hz activity\par better seen on the left,\par \par Background Slowing:\par Diffuse theta and polymorphic delta slowing.\par \par Focal Slowing:\par Continuous theta/delta slowing over the right hemisphere, at times sharply\par contoured\par \par Sleep Background:\par Stage II sleep transients were not recorded.\par \par Other Non-Epileptiform Findings:\par None were present.\par \par Interictal Epileptiform Activity:\par None were present.\par \par Events:\par Clinical events: None recorded.\par Seizures: None recorded.\par \par Activation Procedures:\par Hyperventilation was not performed.\par Photic stimulation was performed and did not elicit any abnormality.\par \par Artifacts:\par Intermittent myogenic and movement artifacts were noted.\par \par ECG:\par The heart rate on single channel ECG was predominantly between 70-80 BPM.\par \par _____________________________________________________________\par EEG SUMMARY/CLASSIFICATION\par \par Abnormal EEG in the awake state\par - Continuous theta/delta slowing over the right hemisphere, at times sharply\par contoured\par - Mild generalized slowing.\par \par _____________________________________________________________\par EEG IMPRESSION/CLINICAL CORRELATE\par \par Abnormal EEG study.\par Structural or functional abnormality in the right hemisphere\par Mild nonspecific diffuse or multifocal cerebral dysfunction.\par No epileptiform pattern or seizure seen.\par \par Giancarlo Garcia MD PGY-5\par Epilepsy Fellow\par \par Lucas Sheikh MD\par EEG/Epilepsy Attending\par \par Reading Room: [phone]\par On Call Service After Hours: [phone]\par \par \par Electronic Signatures:\par Giancarlo Garcia) (Signed 27-Oct-2021 11:53)\par 	Authored: EEG REPORT\par Lucas Sheikh) (Signed 27-Oct-2021 13:29)\par 	Authored: EEG REPORT\par 	Co-Signer: EEG REPORT\par \par \par Last Updated: 27-Oct-2021 13:29 by Lucas Sheikh)

## 2021-12-22 NOTE — HISTORY OF PRESENT ILLNESS
[FreeTextEntry1] : Accompanied by her daughter who is a  in [facility], she presents for a new complaint.  On several occasions in the past 2 months she has been observed to lose consciousness.  The first time it may have occurred was when she was exercising in the gym and collapsed.  This was witnessed.  She rested and then drove home.  On the last occasion she was witnessed by her daughter who saw her right hand stiffen and shake slightly during the episode.\par \par She has not been driving since the last episode.

## 2021-12-22 NOTE — PHYSICAL EXAM
[Person] : oriented to person [Place] : oriented to place [Time] : oriented to time [Concentration Intact] : normal concentrating ability [Visual Intact] : visual attention was ~T not ~L decreased [Naming Objects] : no difficulty naming common objects [Repeating Phrases] : no difficulty repeating a phrase [Writing A Sentence] : no difficulty writing a sentence [Fluency] : fluency intact [Comprehension] : comprehension intact [Reading] : reading intact [Past History] : adequate knowledge of personal past history [Cranial Nerves Optic (II)] : visual acuity intact bilaterally,  visual fields full to confrontation, pupils equal round and reactive to light [Cranial Nerves Oculomotor (III)] : extraocular motion intact [Cranial Nerves Trigeminal (V)] : facial sensation intact symmetrically [Cranial Nerves Facial (VII)] : face symmetrical [Cranial Nerves Vestibulocochlear (VIII)] : hearing was intact bilaterally [Cranial Nerves Glossopharyngeal (IX)] : tongue and palate midline [Cranial Nerves Accessory (XI - Cranial And Spinal)] : head turning and shoulder shrug symmetric [Cranial Nerves Hypoglossal (XII)] : there was no tongue deviation with protrusion [Motor Tone] : muscle tone was normal in all four extremities [Motor Strength] : muscle strength was normal in all four extremities [No Muscle Atrophy] : normal bulk in all four extremities [Sensation Tactile Decrease] : light touch was intact [Abnormal Walk] : normal gait [Balance] : balance was intact [2+] : Ankle jerk left 2+ [Outer Ear] : the ears and nose were normal in appearance [Oropharynx] : the oropharynx was normal [Neck Appearance] : the appearance of the neck was normal [Neck Cervical Mass (___cm)] : no neck mass was observed [Jugular Venous Distention Increased] : there was no jugular-venous distention [Thyroid Diffuse Enlargement] : the thyroid was not enlarged [Thyroid Nodule] : there were no palpable thyroid nodules [Auscultation Breath Sounds / Voice Sounds] : lungs were clear to auscultation bilaterally [Heart Rate And Rhythm] : heart rate was normal and rhythm regular [Heart Sounds] : normal S1 and S2 [Heart Sounds Gallop] : no gallops [Murmurs] : no murmurs [Heart Sounds Pericardial Friction Rub] : no pericardial rub [Full Pulse] : the pedal pulses are present [Edema] : there was no peripheral edema [Bowel Sounds] : normal bowel sounds [Abdomen Soft] : soft [Abdomen Tenderness] : non-tender [] : no hepato-splenomegaly [Abdomen Mass (___ Cm)] : no abdominal mass palpated [Past-pointing] : there was no past-pointing [Tremor] : no tremor present [Plantar Reflex Right Only] : normal on the right [Plantar Reflex Left Only] : normal on the left

## 2022-02-11 ENCOUNTER — APPOINTMENT (OUTPATIENT)
Dept: NEUROLOGY | Facility: CLINIC | Age: 77
End: 2022-02-11
Payer: MEDICARE

## 2022-02-11 VITALS
TEMPERATURE: 97.6 F | WEIGHT: 106 LBS | HEIGHT: 64 IN | DIASTOLIC BLOOD PRESSURE: 88 MMHG | SYSTOLIC BLOOD PRESSURE: 159 MMHG | HEART RATE: 80 BPM | BODY MASS INDEX: 18.1 KG/M2 | OXYGEN SATURATION: 98 %

## 2022-02-11 PROCEDURE — 99215 OFFICE O/P EST HI 40 MIN: CPT

## 2022-02-11 NOTE — PHYSICAL EXAM
[Person] : oriented to person [Place] : oriented to place [Span Intact] : the attention span was normal [Concentration Intact] : normal concentrating ability [Visual Intact] : visual attention was ~T not ~L decreased [Repeating Phrases] : no difficulty repeating a phrase [Comprehension] : comprehension intact [Reading] : reading intact [___ / 5] : Visuospatial / Executive: [unfilled] / 5 [___ / 3] : Attention (Serial 7 subtraction): [unfilled] / 3 [___ / 1] : Fluency: [unfilled] / 1 [___ / 2] : Abstraction: [unfilled] / 2 [___ / 5] : Delayed Recall: [unfilled] / 5 [___ / 6] : Orientation: [unfilled] / 6 [Cranial Nerves Optic (II)] : visual acuity intact bilaterally,  visual fields full to confrontation, pupils equal round and reactive to light [Cranial Nerves Oculomotor (III)] : extraocular motion intact [Cranial Nerves Trigeminal (V)] : facial sensation intact symmetrically [Cranial Nerves Facial (VII)] : face symmetrical [Cranial Nerves Vestibulocochlear (VIII)] : hearing was intact bilaterally [Cranial Nerves Glossopharyngeal (IX)] : tongue and palate midline [Cranial Nerves Accessory (XI - Cranial And Spinal)] : head turning and shoulder shrug symmetric [Cranial Nerves Hypoglossal (XII)] : there was no tongue deviation with protrusion [Motor Tone] : muscle tone was normal in all four extremities [Motor Strength] : muscle strength was normal in all four extremities [Involuntary Movements] : no involuntary movements were seen [Sensation Tactile Decrease] : light touch was intact [Sensation Vibration Decrease] : vibration was intact [2+] : Ankle jerk left 2+ [Sclera] : the sclera and conjunctiva were normal [PERRL With Normal Accommodation] : pupils were equal in size, round, reactive to light, with normal accommodation [Extraocular Movements] : extraocular movements were intact [Auscultation Breath Sounds / Voice Sounds] : lungs were clear to auscultation bilaterally [Heart Rate And Rhythm] : heart rate was normal and rhythm regular [Heart Sounds] : normal S1 and S2 [Heart Sounds Gallop] : no gallops [Murmurs] : no murmurs [Heart Sounds Pericardial Friction Rub] : no pericardial rub [Full Pulse] : the pedal pulses are present [Edema] : there was no peripheral edema [Bowel Sounds] : normal bowel sounds [Abdomen Soft] : soft [Abdomen Tenderness] : non-tender [] : no hepato-splenomegaly [Abdomen Mass (___ Cm)] : no abdominal mass palpated [Time] : disoriented to time [Short Term Intact] : short term memory impaired [Remote Intact] : remote memory impaired [Registration Intact] : recent registration memory impaired [Naming Objects] : difficulty naming common objects [Writing A Sentence] : difficulty writing a sentence [Fluency] : fluency not intact [Current Events] : inadequate knowledge of current events [Vocabulary] : inadequate range of vocabulary [Paresis Pronator Drift Right-Sided] : no pronator drift on the right [Paresis Pronator Drift Left-Sided] : no pronator drift on the left [Motor Strength Upper Extremities Bilaterally] : strength was normal in both upper extremities [Motor Strength Lower Extremities Bilaterally] : strength was normal in both lower extremities [Romberg's Sign] : Romberg's sign was negtive [Allodynia] : no ~T allodynia present [Dysesthesia] : no dysesthesia [Dysdiadochokinesia Bilaterally] : not present [Coordination - Dysmetria Impaired Finger-to-Nose Bilateral] : not present [Coordination - Dysmetria Impaired Heel-to-Shin Bilateral] : not present [Plantar Reflex Right Only] : normal on the right [Plantar Reflex Left Only] : normal on the left [MocaTotal] : 10

## 2022-02-11 NOTE — DISCUSSION/SUMMARY
[FreeTextEntry1] : Discussed with her  and her daughter who communicated by face time.  She is going to resume levetiracetam at a lower dose and return for follow-up after a month when dose increase protocol will be arranged.  Her daughter asked whether she can resume driving.  However, her MoCA test indicates moderately advanced dementia.  On this criterion alone she is advised not to drive.  The MRI scan of the brain was discussed and the images were shown to her daughter and both her and her .  There is a moderate increase in microvascular ischemic changes in the subcortical and periventricular deep white matter.  In my opinion however, the extent of microvascular ischemic changes could not be responsible for the level of dementia according to the MoCA scale.  This means that she has probable comorbid Alzheimer's disease.\par Upon review of the blood test done at the last visit, she requires a higher dose of pravastatin because her fasting lipid profile remains high.  Also she needs the low lymphocyte absolute count to be addressed by her primary care provider.  I shall send the request to her PCP.\par \par She is encouraged to walk 2 miles a day.  Her MoCA test will be repeated every 6 months to a year to monitor and advise treatment/ therapy according to its rate of decline.  Advised walking 2 miles a day for maintenance of brain volume

## 2022-02-11 NOTE — REVIEW OF SYSTEMS
[Memory Lapses or Loss] : memory loss [Seizures] : convulsions [Fainting] : fainting [Negative] : Heme/Lymph

## 2022-02-11 NOTE — HISTORY OF PRESENT ILLNESS
[FreeTextEntry1] : Ms. Oswald returns for follow-up.  She had to discontinue the levetiracetam because it caused excessive sedation according to her  who was present with her.  She had one episode of brief loss of consciousness when in [employer].  She is increasingly forgetful.

## 2022-03-22 ENCOUNTER — APPOINTMENT (OUTPATIENT)
Dept: NEUROLOGY | Facility: CLINIC | Age: 77
End: 2022-03-22
Payer: MEDICARE

## 2022-03-29 ENCOUNTER — APPOINTMENT (OUTPATIENT)
Dept: NEUROLOGY | Facility: CLINIC | Age: 77
End: 2022-03-29
Payer: MEDICARE

## 2022-03-29 VITALS
TEMPERATURE: 96.6 F | WEIGHT: 105 LBS | OXYGEN SATURATION: 95 % | DIASTOLIC BLOOD PRESSURE: 87 MMHG | BODY MASS INDEX: 17.93 KG/M2 | HEART RATE: 83 BPM | HEIGHT: 64 IN | SYSTOLIC BLOOD PRESSURE: 158 MMHG

## 2022-03-29 DIAGNOSIS — F03.90 UNSPECIFIED DEMENTIA W/OUT BEHAVIORAL DISTURBANCE: ICD-10-CM

## 2022-03-29 DIAGNOSIS — R94.01 ABNORMAL ELECTROENCEPHALOGRAM [EEG]: ICD-10-CM

## 2022-03-29 PROCEDURE — 99213 OFFICE O/P EST LOW 20 MIN: CPT

## 2022-03-29 NOTE — DISCUSSION/SUMMARY
[FreeTextEntry1] : Advance dementia requiring 24/7 care. Recommend home nursing consultation and attendant/

## 2022-03-29 NOTE — HISTORY OF PRESENT ILLNESS
[FreeTextEntry1] : Accompanied by son daughter on phone; denies seizures; using only 250 mg twice daily of levetiracetam; rapidly worsening dementia, incontinent and unable to attend to personal hygiene

## 2022-03-29 NOTE — PHYSICAL EXAM
[Person] : disoriented to person [Place] : disoriented to place [Time] : disoriented to time [Short Term Intact] : short term memory impaired [Remote Intact] : remote memory impaired [Span Intact] : the attention span was decreased [Concentration Intact] : a decrease in concentrating ability was observed [Visual Intact] : visual attention was ~T ~L decreased [Naming Objects] : difficulty naming common objects [Repeating Phrases] : difficulty repeating a phrase [Writing A Sentence] : difficulty writing a sentence [Comprehension] : comprehension not intact [Reading] : difficulty reading [Cranial Nerves Optic (II)] : visual acuity intact bilaterally,  visual fields full to confrontation, pupils equal round and reactive to light [Cranial Nerves Oculomotor (III)] : extraocular motion intact [Cranial Nerves Trigeminal (V)] : facial sensation intact symmetrically [Cranial Nerves Facial (VII)] : face symmetrical [Cranial Nerves Vestibulocochlear (VIII)] : hearing was intact bilaterally [Cranial Nerves Glossopharyngeal (IX)] : tongue and palate midline [Cranial Nerves Accessory (XI - Cranial And Spinal)] : head turning and shoulder shrug symmetric [Cranial Nerves Hypoglossal (XII)] : there was no tongue deviation with protrusion [Paresis Pronator Drift Right-Sided] : no pronator drift on the right [Paresis Pronator Drift Left-Sided] : no pronator drift on the left [Motor Strength Upper Extremities Bilaterally] : strength was normal in both upper extremities [Motor Strength Lower Extremities Bilaterally] : strength was normal in both lower extremities [2+] : Biceps left 2+ [1+] : Ankle jerk left 1+ [Plantar Reflex Right Only] : normal on the right [Plantar Reflex Left Only] : normal on the left [Sclera] : the sclera and conjunctiva were normal [PERRL With Normal Accommodation] : pupils were equal in size, round, reactive to light, with normal accommodation [Extraocular Movements] : extraocular movements were intact [Outer Ear] : the ears and nose were normal in appearance [Oropharynx] : the oropharynx was normal

## 2022-04-09 DIAGNOSIS — G93.40 ENCEPHALOPATHY, UNSPECIFIED: ICD-10-CM

## 2022-04-11 ENCOUNTER — RX RENEWAL (OUTPATIENT)
Age: 77
End: 2022-04-11

## 2022-04-11 RX ORDER — DIVALPROEX SODIUM 500 MG/1
500 TABLET, DELAYED RELEASE ORAL TWICE DAILY
Qty: 180 | Refills: 0 | Status: ACTIVE | COMMUNITY
Start: 2022-04-09 | End: 1900-01-01

## 2022-05-09 ENCOUNTER — RX RENEWAL (OUTPATIENT)
Age: 77
End: 2022-05-09

## 2022-07-05 ENCOUNTER — APPOINTMENT (OUTPATIENT)
Dept: NEUROLOGY | Facility: CLINIC | Age: 77
End: 2022-07-05

## 2023-01-17 LAB
ALBUMIN SERPL ELPH-MCNC: 4 G/DL
ALP BLD-CCNC: 162 U/L
ALT SERPL-CCNC: 25 U/L
ANION GAP SERPL CALC-SCNC: 11 MMOL/L
AST SERPL-CCNC: 21 U/L
BASOPHILS # BLD AUTO: 0.07 K/UL
BASOPHILS NFR BLD AUTO: 0.8 %
BILIRUB SERPL-MCNC: 0.3 MG/DL
BUN SERPL-MCNC: 10 MG/DL
CALCIUM SERPL-MCNC: 9.8 MG/DL
CHLORIDE SERPL-SCNC: 99 MMOL/L
CHOLEST SERPL-MCNC: 196 MG/DL
CO2 SERPL-SCNC: 28 MMOL/L
CREAT SERPL-MCNC: 0.65 MG/DL
EOSINOPHIL # BLD AUTO: 0.08 K/UL
EOSINOPHIL NFR BLD AUTO: 0.9 %
GLUCOSE SERPL-MCNC: 163 MG/DL
HCT VFR BLD CALC: 42.7 %
HDLC SERPL-MCNC: 61 MG/DL
HGB BLD-MCNC: 13 G/DL
IMM GRANULOCYTES NFR BLD AUTO: 0.2 %
LDLC SERPL CALC-MCNC: 120 MG/DL
LEVETIRACETAM SERPL-MCNC: <1 UG/ML
LYMPHOCYTES # BLD AUTO: 0.93 K/UL
LYMPHOCYTES NFR BLD AUTO: 10.6 %
MAN DIFF?: NORMAL
MCHC RBC-ENTMCNC: 30.3 PG
MCHC RBC-ENTMCNC: 30.4 GM/DL
MCV RBC AUTO: 99.5 FL
MONOCYTES # BLD AUTO: 1.07 K/UL
MONOCYTES NFR BLD AUTO: 12.2 %
NEUTROPHILS # BLD AUTO: 6.59 K/UL
NEUTROPHILS NFR BLD AUTO: 75.3 %
NONHDLC SERPL-MCNC: 135 MG/DL
PLATELET # BLD AUTO: 273 K/UL
POTASSIUM SERPL-SCNC: 4.2 MMOL/L
PROT SERPL-MCNC: 7.8 G/DL
RBC # BLD: 4.29 M/UL
RBC # FLD: 13.9 %
SODIUM SERPL-SCNC: 138 MMOL/L
TRIGL SERPL-MCNC: 74 MG/DL
WBC # FLD AUTO: 8.76 K/UL

## 2023-06-07 NOTE — ED ADULT NURSE NOTE - NSFALLRSKASSESSDT_ED_ALL_ED
Attempt to contact patient regarding an appointment with pain management. Left message stating that her appointment is scheduled for 10:00 am on 07/11/2023 to see Dr. Alex Matthew at the [facility] location. Also left number for patient to return call back  to [phone]. Thanks.   02-Aug-2021 16:57

## 2023-06-15 NOTE — PATIENT PROFILE ADULT - NSPRONUTRITIONRISK_GEN_A_NUR
Duration Of Freeze Thaw-Cycle (Seconds): 0 Post-Care Instructions: I reviewed with the patient in detail post-care instructions. Patient is to wear sunprotection, and avoid picking at any of the treated lesions. Pt may apply Vaseline to crusted or scabbing areas. Detail Level: Detailed Render Post-Care Instructions In Note?: no Show Aperture Variable?: Yes Consent: The patient's consent was obtained including but not limited to risks of crusting, scabbing, blistering, scarring, darker or lighter pigmentary change, recurrence, incomplete removal and infection. Spray Paint Text: The liquid nitrogen was applied to the skin utilizing a spray paint frosting technique. Medical Necessity Information: It is in your best interest to select a reason for this procedure from the list below. All of these items fulfill various CMS LCD requirements except the new and changing color options. Medical Necessity Clause: This procedure was medically necessary because the lesions that were treated were: Post-Care Instructions: I reviewed with the patient in detail post-care instructions. Patient is to wear sunprotection, and avoid picking at any of the treated lesions. Pt may apply Vaseline to crusted or scabbing areas. Patient is aware cryo will likely cause blister, scab, and that multiple treatments may be necessary No indicators present

## 2023-12-15 NOTE — ED ADULT NURSE NOTE - URINE CHARACTERISTICS
HR=64 bpm, MBJG=164/51 mmhg, EiM8=334.0 %, Resp=13 B/min, EtCO2=35 mmHg, Apnea=1 Seconds, Pain=0, Tamar=9, Cruz=2 clear

## 2025-02-25 NOTE — PATIENT PROFILE ADULT - ARE SIGNIFICANT INDICATORS COMPLETE.
Yes
Bed in lowest position, wheels locked, appropriate side rails in place/Call bell, personal items and telephone in reach/Instruct patient to call for assistance before getting out of bed or chair/Non-slip footwear when patient is out of bed/[place] to call system/Physically safe environment - no spills, clutter or unnecessary equipment/Purposeful Proactive Rounding/Room/bathroom lighting operational, light cord in reach

## 2025-07-29 NOTE — DISCHARGE NOTE PROVIDER - NSDCCPGOAL_GEN_ALL_CORE_FT
I LVM to inform patient both lesions were non cancerous and no follow up was needed unless she is having problems.     To get better and follow your care plan as instructed.